# Patient Record
Sex: MALE | Race: WHITE | NOT HISPANIC OR LATINO | Employment: FULL TIME | URBAN - METROPOLITAN AREA
[De-identification: names, ages, dates, MRNs, and addresses within clinical notes are randomized per-mention and may not be internally consistent; named-entity substitution may affect disease eponyms.]

---

## 2021-11-07 ENCOUNTER — HOSPITAL ENCOUNTER (EMERGENCY)
Facility: HOSPITAL | Age: 51
Discharge: HOME | End: 2021-11-07
Attending: EMERGENCY MEDICINE | Admitting: EMERGENCY MEDICINE

## 2021-11-07 VITALS
RESPIRATION RATE: 16 BRPM | DIASTOLIC BLOOD PRESSURE: 99 MMHG | OXYGEN SATURATION: 96 % | HEART RATE: 102 BPM | TEMPERATURE: 99.3 F | SYSTOLIC BLOOD PRESSURE: 133 MMHG | HEIGHT: 74 IN | BODY MASS INDEX: 28.23 KG/M2 | WEIGHT: 220 LBS

## 2021-11-07 DIAGNOSIS — S81.812A LACERATION OF LEFT LOWER EXTREMITY, INITIAL ENCOUNTER: Primary | ICD-10-CM

## 2021-11-07 PROCEDURE — 99282 EMERGENCY DEPT VISIT SF MDM: CPT

## 2021-11-07 RX ORDER — CITALOPRAM 40 MG/1
40 TABLET ORAL DAILY
COMMUNITY

## 2021-11-07 RX ORDER — LIDOCAINE HYDROCHLORIDE 10 MG/ML
10 INJECTION, SOLUTION EPIDURAL; INFILTRATION; INTRACAUDAL; PERINEURAL ONCE
Status: COMPLETED | OUTPATIENT
Start: 2021-11-07 | End: 2021-11-07

## 2021-11-07 RX ORDER — LEVOCETIRIZINE DIHYDROCHLORIDE 5 MG/1
5 TABLET, FILM COATED ORAL EVERY EVENING
COMMUNITY

## 2021-11-07 RX ORDER — OMEPRAZOLE 20 MG/1
20 CAPSULE, DELAYED RELEASE ORAL DAILY
COMMUNITY

## 2021-11-07 RX ORDER — CEPHALEXIN 500 MG/1
500 CAPSULE ORAL 2 TIMES DAILY
Qty: 20 CAPSULE | Refills: 0 | Status: SHIPPED | OUTPATIENT
Start: 2021-11-07 | End: 2021-11-17

## 2021-11-07 RX ADMIN — LIDOCAINE HYDROCHLORIDE 10 ML: 10 INJECTION, SOLUTION EPIDURAL; INFILTRATION; INTRACAUDAL; PERINEURAL at 12:05

## 2021-11-07 NOTE — ED PROVIDER NOTES
Subjective   Pt is a 52 yo male presenting to ED with complaints of left lower leg laceration. Pt reports last night around midnight was jumping over a guardrail after [employer] game and cut his pants. He didn't realize until later that he had a laceration to left lower anterior leg about 6-7 cm long. He denies any pain with walking or known foreign bodies. He reports his Tdap is UTD. He denies any other injuries.       History provided by:  Patient      Review of Systems   Constitutional: Negative for fever.   Respiratory: Negative for cough and shortness of breath.    Cardiovascular: Negative for chest pain.   Gastrointestinal: Negative for abdominal pain, nausea and vomiting.   Musculoskeletal: Negative for arthralgias and back pain.   Skin: Positive for wound (laceration left leg).   Neurological: Negative for weakness and numbness.       History reviewed. No pertinent past medical history.    Allergies   Allergen Reactions   • Penicillins Rash       Past Surgical History:   Procedure Laterality Date   • WISDOM TOOTH EXTRACTION         History reviewed. No pertinent family history.    Social History     Socioeconomic History   • Marital status:    Tobacco Use   • Smoking status: Current Every Day Smoker     Packs/day: 1.00   Substance and Sexual Activity   • Alcohol use: Yes     Comment: 20 beers/wk   • Drug use: Not Currently           Objective   Physical Exam  Vitals and nursing note reviewed.   Constitutional:       General: He is not in acute distress.     Appearance: He is normal weight.   HENT:      Head: Atraumatic.   Eyes:      Extraocular Movements: Extraocular movements intact.      Conjunctiva/sclera: Conjunctivae normal.   Cardiovascular:      Rate and Rhythm: Regular rhythm. Tachycardia present.      Pulses: Normal pulses.      Heart sounds: Normal heart sounds.   Pulmonary:      Effort: Pulmonary effort is normal. No respiratory distress.   Musculoskeletal:         General: Normal range of motion.       Cervical back: Normal range of motion and neck supple.      Left lower leg: Laceration present. No tenderness.        Legs:    Skin:     General: Skin is warm.   Neurological:      General: No focal deficit present.      Mental Status: He is alert and oriented to person, place, and time.   Psychiatric:         Mood and Affect: Mood normal.         Behavior: Behavior normal.         Laceration Repair    Date/Time: 11/7/2021 12:00 PM  Performed by: Lety Ca PA  Authorized by: Veronica Silva MD     Consent:     Consent obtained:  Verbal    Consent given by:  Patient    Risks discussed:  Infection, need for additional repair, nerve damage, pain, poor cosmetic result, poor wound healing, retained foreign body, tendon damage and vascular damage    Alternatives discussed:  No treatment  Anesthesia (see MAR for exact dosages):     Anesthesia method:  Local infiltration    Local anesthetic:  Lidocaine 1% w/o epi  Laceration details:     Location:  Leg    Leg location:  L lower leg    Length (cm):  7  Repair type:     Repair type:  Simple  Pre-procedure details:     Preparation:  Patient was prepped and draped in usual sterile fashion  Exploration:     Contaminated: no    Treatment:     Area cleansed with:  Hibiclens and saline    Amount of cleaning:  Standard    Irrigation solution:  Sterile saline  Skin repair:     Repair method:  Sutures    Suture size:  4-0    Suture material:  Nylon    Suture technique:  Simple interrupted    Number of sutures:  16  Approximation:     Approximation:  Close  Post-procedure details:     Dressing:  Non-adherent dressing    Patient tolerance of procedure:  Tolerated well, no immediate complications               ED Course      No results found for this or any previous visit (from the past 24 hour(s)).  Note: In addition to lab results from this visit, the labs listed above may include labs taken at another facility or during a different encounter within the last 24 hours.  "Please correlate lab times with ED admission and discharge times for further clarification of the services performed during this visit.    No orders to display     Vitals:    11/07/21 0937 11/07/21 1000   BP: 142/94 133/99   BP Location: Left arm    Patient Position: Sitting    Pulse: 102    Resp: 16    Temp: 99.3 °F (37.4 °C)    TempSrc: Tympanic    SpO2: 97% 96%   Weight: 99.8 kg (220 lb)    Height: 188 cm (74\")      Medications   lidocaine PF 1% (XYLOCAINE) injection 10 mL (10 mL Injection Given by Other 11/7/21 1205)     ECG/EMG Results (last 24 hours)     ** No results found for the last 24 hours. **        No orders to display     DISCHARGE    Patient discharged in stable condition.    Reviewed implications of results, diagnosis, meds, responsibility to follow up, warning signs and symptoms of possible worsening, potential complications and reasons to return to ER.    Patient/Family voiced understanding of above instructions.    Discussed plan for discharge, as there is no emergent indication for admission.  Pt/family is agreeable and understands need for follow up and possible repeat testing.  Pt/family is aware that discharge does not mean that nothing is wrong but that it indicates no emergency is currently present that requires admission and they must continue care with follow-up as given below or with a physician of their choice.     FOLLOW-UP  PATIENT CONNECTION - [facility] [address]  [phone]  In 10 days  For suture removal, For wound re-check    [facility] Emergency Department  [address] [address] [phone]    If symptoms worsen         Medication List      New Prescriptions    cephalexin 500 MG capsule  Commonly known as: KEFLEX  Take 1 capsule by mouth 2 (Two) Times a Day for 10 days.           Where to Get Your Medications      These medications were sent to [facility]/pharmacy #[id number] - [place], [place] - [address] - [phone] "  - [phone] FX  [address], [facility] [address]    Hours: 24-hours Phone: [phone]   · cephalexin 500 MG capsule                                              MDM    Final diagnoses:   Laceration of left lower extremity, initial encounter       ED Disposition  ED Disposition     ED Disposition Condition Comment    Discharge Stable           PATIENT CONNECTION - [facility] [address]  [phone]  In 10 days  For suture removal, For wound re-check    [facility] Emergency Department  [address] [facility] [address] [phone]    If symptoms worsen         Medication List      New Prescriptions    cephalexin 500 MG capsule  Commonly known as: KEFLEX  Take 1 capsule by mouth 2 (Two) Times a Day for 10 days.           Where to Get Your Medications      These medications were sent to [facility]/pharmacy #[id number] - [place], [place] - [address] - [phone]  - [phone] FX  [address], [facility] [address]    Hours: 24-hours Phone: [phone]   · cephalexin 500 MG capsule          Lety Ca PA  11/07/21 1554

## 2024-05-18 ENCOUNTER — APPOINTMENT (OUTPATIENT)
Dept: GENERAL RADIOLOGY | Facility: HOSPITAL | Age: 54
DRG: 310 | End: 2024-05-18
Payer: MEDICAID

## 2024-05-18 ENCOUNTER — HOSPITAL ENCOUNTER (INPATIENT)
Facility: HOSPITAL | Age: 54
LOS: 1 days | Discharge: HOME | DRG: 310 | End: 2024-05-19
Attending: EMERGENCY MEDICINE | Admitting: INTERNAL MEDICINE
Payer: MEDICAID

## 2024-05-18 DIAGNOSIS — R06.02 SHORTNESS OF BREATH: ICD-10-CM

## 2024-05-18 DIAGNOSIS — R79.89 ELEVATED TROPONIN: ICD-10-CM

## 2024-05-18 DIAGNOSIS — R07.9 CHEST PAIN, UNSPECIFIED TYPE: ICD-10-CM

## 2024-05-18 DIAGNOSIS — R00.2 RAPID PALPITATIONS: Primary | ICD-10-CM

## 2024-05-18 DIAGNOSIS — I47.10 SVT (SUPRAVENTRICULAR TACHYCARDIA): ICD-10-CM

## 2024-05-18 LAB
ALBUMIN SERPL-MCNC: 4.1 G/DL (ref 3.5–5.2)
ALBUMIN/GLOB SERPL: 1.5 G/DL
ALP SERPL-CCNC: 109 U/L (ref 39–117)
ALT SERPL W P-5'-P-CCNC: 15 U/L (ref 1–41)
ANION GAP SERPL CALCULATED.3IONS-SCNC: 15 MMOL/L (ref 5–15)
AST SERPL-CCNC: 20 U/L (ref 1–40)
BASOPHILS # BLD AUTO: 0.05 10*3/MM3 (ref 0–0.2)
BASOPHILS NFR BLD AUTO: 0.7 % (ref 0–1.5)
BILIRUB SERPL-MCNC: 0.3 MG/DL (ref 0–1.2)
BUN SERPL-MCNC: 13 MG/DL (ref 6–20)
BUN/CREAT SERPL: 10.9 (ref 7–25)
CALCIUM SPEC-SCNC: 8.9 MG/DL (ref 8.6–10.5)
CHLORIDE SERPL-SCNC: 103 MMOL/L (ref 98–107)
CO2 SERPL-SCNC: 20 MMOL/L (ref 22–29)
CREAT SERPL-MCNC: 1.19 MG/DL (ref 0.76–1.27)
D DIMER PPP FEU-MCNC: <0.27 MCGFEU/ML (ref 0–0.52)
DEPRECATED RDW RBC AUTO: 44.5 FL (ref 37–54)
EGFRCR SERPLBLD CKD-EPI 2021: 73.5 ML/MIN/1.73
EOSINOPHIL # BLD AUTO: 0.08 10*3/MM3 (ref 0–0.4)
EOSINOPHIL NFR BLD AUTO: 1.1 % (ref 0.3–6.2)
ERYTHROCYTE [DISTWIDTH] IN BLOOD BY AUTOMATED COUNT: 13.1 % (ref 12.3–15.4)
GEN 5 2HR TROPONIN T REFLEX: 36 NG/L
GLOBULIN UR ELPH-MCNC: 2.8 GM/DL
GLUCOSE SERPL-MCNC: 179 MG/DL (ref 65–99)
HCT VFR BLD AUTO: 45.2 % (ref 37.5–51)
HGB BLD-MCNC: 15.1 G/DL (ref 13–17.7)
HOLD SPECIMEN: NORMAL
IMM GRANULOCYTES # BLD AUTO: 0.04 10*3/MM3 (ref 0–0.05)
IMM GRANULOCYTES NFR BLD AUTO: 0.6 % (ref 0–0.5)
LYMPHOCYTES # BLD AUTO: 1.41 10*3/MM3 (ref 0.7–3.1)
LYMPHOCYTES NFR BLD AUTO: 19.6 % (ref 19.6–45.3)
MAGNESIUM SERPL-MCNC: 1.9 MG/DL (ref 1.6–2.6)
MCH RBC QN AUTO: 30.8 PG (ref 26.6–33)
MCHC RBC AUTO-ENTMCNC: 33.4 G/DL (ref 31.5–35.7)
MCV RBC AUTO: 92.1 FL (ref 79–97)
MONOCYTES # BLD AUTO: 0.44 10*3/MM3 (ref 0.1–0.9)
MONOCYTES NFR BLD AUTO: 6.1 % (ref 5–12)
NEUTROPHILS NFR BLD AUTO: 5.18 10*3/MM3 (ref 1.7–7)
NEUTROPHILS NFR BLD AUTO: 71.9 % (ref 42.7–76)
NRBC BLD AUTO-RTO: 0 /100 WBC (ref 0–0.2)
NT-PROBNP SERPL-MCNC: 68.2 PG/ML (ref 0–900)
PLATELET # BLD AUTO: 202 10*3/MM3 (ref 140–450)
PMV BLD AUTO: 10.6 FL (ref 6–12)
POTASSIUM SERPL-SCNC: 3.8 MMOL/L (ref 3.5–5.2)
PROT SERPL-MCNC: 6.9 G/DL (ref 6–8.5)
RBC # BLD AUTO: 4.91 10*6/MM3 (ref 4.14–5.8)
SODIUM SERPL-SCNC: 138 MMOL/L (ref 136–145)
TROPONIN T DELTA: 20 NG/L
TROPONIN T SERPL HS-MCNC: 16 NG/L
TSH SERPL DL<=0.05 MIU/L-ACNC: 1.7 UIU/ML (ref 0.27–4.2)
WBC NRBC COR # BLD AUTO: 7.2 10*3/MM3 (ref 3.4–10.8)
WHOLE BLOOD HOLD COAG: NORMAL
WHOLE BLOOD HOLD SPECIMEN: NORMAL

## 2024-05-18 PROCEDURE — 84484 ASSAY OF TROPONIN QUANT: CPT | Performed by: EMERGENCY MEDICINE

## 2024-05-18 PROCEDURE — 85025 COMPLETE CBC W/AUTO DIFF WBC: CPT | Performed by: EMERGENCY MEDICINE

## 2024-05-18 PROCEDURE — 36415 COLL VENOUS BLD VENIPUNCTURE: CPT

## 2024-05-18 PROCEDURE — 83880 ASSAY OF NATRIURETIC PEPTIDE: CPT | Performed by: EMERGENCY MEDICINE

## 2024-05-18 PROCEDURE — 85379 FIBRIN DEGRADATION QUANT: CPT | Performed by: EMERGENCY MEDICINE

## 2024-05-18 PROCEDURE — 84443 ASSAY THYROID STIM HORMONE: CPT | Performed by: EMERGENCY MEDICINE

## 2024-05-18 PROCEDURE — 80053 COMPREHEN METABOLIC PANEL: CPT | Performed by: EMERGENCY MEDICINE

## 2024-05-18 PROCEDURE — 93005 ELECTROCARDIOGRAM TRACING: CPT | Performed by: EMERGENCY MEDICINE

## 2024-05-18 PROCEDURE — 71045 X-RAY EXAM CHEST 1 VIEW: CPT

## 2024-05-18 PROCEDURE — 83735 ASSAY OF MAGNESIUM: CPT | Performed by: EMERGENCY MEDICINE

## 2024-05-18 PROCEDURE — 99285 EMERGENCY DEPT VISIT HI MDM: CPT

## 2024-05-18 RX ORDER — SODIUM CHLORIDE 9 MG/ML
40 INJECTION, SOLUTION INTRAVENOUS AS NEEDED
Status: DISCONTINUED | OUTPATIENT
Start: 2024-05-18 | End: 2024-05-19 | Stop reason: HOSPADM

## 2024-05-18 RX ORDER — ROSUVASTATIN CALCIUM 20 MG/1
20 TABLET, COATED ORAL NIGHTLY
Status: DISCONTINUED | OUTPATIENT
Start: 2024-05-18 | End: 2024-05-19 | Stop reason: HOSPADM

## 2024-05-18 RX ORDER — BISACODYL 5 MG/1
5 TABLET, DELAYED RELEASE ORAL DAILY PRN
Status: DISCONTINUED | OUTPATIENT
Start: 2024-05-18 | End: 2024-05-19 | Stop reason: HOSPADM

## 2024-05-18 RX ORDER — NITROGLYCERIN 0.4 MG/1
0.4 TABLET SUBLINGUAL
Status: DISCONTINUED | OUTPATIENT
Start: 2024-05-18 | End: 2024-05-19 | Stop reason: HOSPADM

## 2024-05-18 RX ORDER — SODIUM CHLORIDE 0.9 % (FLUSH) 0.9 %
10 SYRINGE (ML) INJECTION AS NEEDED
Status: DISCONTINUED | OUTPATIENT
Start: 2024-05-18 | End: 2024-05-19 | Stop reason: HOSPADM

## 2024-05-18 RX ORDER — BISACODYL 10 MG
10 SUPPOSITORY, RECTAL RECTAL DAILY PRN
Status: DISCONTINUED | OUTPATIENT
Start: 2024-05-18 | End: 2024-05-19 | Stop reason: HOSPADM

## 2024-05-18 RX ORDER — SODIUM CHLORIDE 0.9 % (FLUSH) 0.9 %
10 SYRINGE (ML) INJECTION EVERY 12 HOURS SCHEDULED
Status: DISCONTINUED | OUTPATIENT
Start: 2024-05-18 | End: 2024-05-19 | Stop reason: HOSPADM

## 2024-05-18 RX ORDER — AMOXICILLIN 250 MG
2 CAPSULE ORAL 2 TIMES DAILY
Status: DISCONTINUED | OUTPATIENT
Start: 2024-05-18 | End: 2024-05-19 | Stop reason: HOSPADM

## 2024-05-18 RX ORDER — ASPIRIN 81 MG/1
81 TABLET ORAL DAILY
Status: DISCONTINUED | OUTPATIENT
Start: 2024-05-18 | End: 2024-05-19 | Stop reason: HOSPADM

## 2024-05-18 RX ORDER — POLYETHYLENE GLYCOL 3350 17 G/17G
17 POWDER, FOR SOLUTION ORAL DAILY PRN
Status: DISCONTINUED | OUTPATIENT
Start: 2024-05-18 | End: 2024-05-19 | Stop reason: HOSPADM

## 2024-05-18 RX ADMIN — METOPROLOL TARTRATE 25 MG: 25 TABLET, FILM COATED ORAL at 16:53

## 2024-05-18 RX ADMIN — Medication 10 ML: at 20:12

## 2024-05-18 RX ADMIN — ROSUVASTATIN CALCIUM 20 MG: 20 TABLET, COATED ORAL at 20:11

## 2024-05-18 RX ADMIN — ASPIRIN 81 MG: 81 TABLET, COATED ORAL at 16:54

## 2024-05-18 NOTE — ED PROVIDER NOTES
Subjective   History of Present Illness  Mr. Rodrigues presents by ambulance with chest pain and shortness of breath and tachypalpitations.  He reports that he felt a skipped beat and then had to catch his breath.  Shortly after that his heart began beating very quickly.  He developed shortness of breath.  He developed chest pain which she describes as a sensation of not being able to get a deep enough breath.  He tells me this has happened multiple times over a 10 or 15-year period.  He tells me he still feels dizzy although the feeling of rapid heart rate and shortness of breath are gone.  He denies any family history of heart trouble.  He tells me he takes a pill for high cholesterol and diabetes as well as Zoloft.  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      Review of Systems    History reviewed. No pertinent past medical history.    Allergies   Allergen Reactions    Penicillins Rash       Past Surgical History:   Procedure Laterality Date    WISDOM TOOTH EXTRACTION         History reviewed. No pertinent family history.    Social History     Socioeconomic History    Marital status:    Tobacco Use    Smoking status: Every Day     Current packs/day: 1.00     Types: Cigarettes   Vaping Use    Vaping status: Never Used   Substance and Sexual Activity    Alcohol use: Yes     Comment: 20 beers/wk    Drug use: Not Currently           Objective   Physical Exam  Vitals and nursing note reviewed.   Constitutional:       General: He is not in acute distress.     Appearance: Normal appearance.   HENT:      Head: Normocephalic and atraumatic.      Nose: Nose normal. No congestion or rhinorrhea.   Eyes:      General: No scleral icterus.     Conjunctiva/sclera: Conjunctivae normal.   Neck:      Comments: No JVD   Cardiovascular:      Rate and Rhythm: Regular rhythm. Tachycardia present.      Heart sounds: No murmur heard.     No friction rub.   Pulmonary:      Breath sounds: Normal breath sounds. No wheezing or rales.       Comments: He is tachypneic and taking deep breaths  Abdominal:      General: Bowel sounds are normal.      Palpations: Abdomen is soft.      Tenderness: There is no abdominal tenderness. There is no guarding or rebound.   Musculoskeletal:         General: No tenderness.      Cervical back: Normal range of motion and neck supple.      Right lower leg: No edema.      Left lower leg: No edema.   Skin:     General: Skin is warm and dry.      Coloration: Skin is not pale.      Findings: No erythema.   Neurological:      General: No focal deficit present.      Mental Status: He is alert and oriented to person, place, and time.      Motor: No weakness.      Coordination: Coordination normal.   Psychiatric:         Mood and Affect: Mood normal.         Behavior: Behavior normal.         Thought Content: Thought content normal.         Procedures           ED Course  ED Course as of 05/19/24 0655   Sat May 18, 2024   1204 Reviewed electronic medical record.  Reviewed the rhythm strip EMS brought.  This shows only sinus tachycardia. [DT]   1334 He remains in normal sinus rhythm without complaint.  I spoke with him and his friend about follow-up in the [facility] [place] heart and valve clinic for further evaluation.  Follow-up with his PCP.  Will discharge as long as second troponin is reassuring [DT]   1523 I discussed the case with Dr. Dukes regarding the change in trop. I messaged with the hospitalist.  I talked with the patient and advised him of the findings and the plan for admission.  The patient was initially hesitant but after further discussion is agreeable. [RS]      ED Course User Index  [DT] Efraín Dukes MD  [RS] Aleksander Cummings MD                                             Medical Decision Making  Please see course notes.  I ordered and interpreted multiple labs including troponins several hours apart.  Observed him on a monitor for several hours.  Had multiple reevaluations.  Consulted cardiology who admitted  him to the hospital    Problems Addressed:  Chest pain, unspecified type: complicated acute illness or injury  Elevated troponin: complicated acute illness or injury that poses a threat to life or bodily functions  Rapid palpitations: complicated acute illness or injury  Shortness of breath: complicated acute illness or injury    Amount and/or Complexity of Data Reviewed  Labs: ordered. Decision-making details documented in ED Course.  Radiology: ordered. Decision-making details documented in ED Course.  ECG/medicine tests: ordered. Decision-making details documented in ED Course.    Risk  Prescription drug management.  Decision regarding hospitalization.        Final diagnoses:   Rapid palpitations   Chest pain, unspecified type   Shortness of breath   Elevated troponin       ED Disposition  ED Disposition       ED Disposition   Decision to Admit    Condition   --    Comment   Level of Care: Telemetry [5]   Diagnosis: SVT (supraventricular tachycardia) [202906]   Certification: I Certify That Inpatient Hospital Services Are Medically Necessary For Greater Than 2 Midnights                 No follow-up provider specified.       Medication List      No changes were made to your prescriptions during this visit.            Efraín Dukes MD  05/19/24 6199

## 2024-05-18 NOTE — H&P
[facility] History & Physical    Referring Provider: No ref. provider found    Patient Care Team:  Provider, No Known as PCP - General    Chief complaint   palpitations    Subjective .     History of present illness: 53-year-old man with a history of tobacco use, type 2 diabetes mellitus and hyperlipidemia seen in the emergency room this afternoon.  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  His symptoms lasted for approximately 20 minutes and resolved spontaneously.  He was then brought to the emergency room for further evaluation.  He denies any anginal type complaints.  He did not have miles syncope.  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  He also plays golf.  When he does not have tachypalpitations he has no history of angina or any prior history of cardiac disease.  He states that he has had these kinds of episodes of tachypalpitations over the past 10 to 15 years.  Sometimes he will have 2-3 episodes a month and then he will not have any for couple of years.    Review of System  A 14 point review of systems was negative except as was stated in the HPI    History  History reviewed. No pertinent past medical history.  Past Surgical History:   Procedure Laterality Date    WISDOM TOOTH EXTRACTION       History reviewed. No pertinent family history.  Social History     Tobacco Use    Smoking status: Every Day     Current packs/day: 1.00     Types: Cigarettes   Substance Use Topics    Alcohol use: Yes     Comment: 20 beers/wk    Drug use: Not Currently     (Not in a hospital admission)    Scheduled Meds:     Continuous Infusions:     PRN Meds:    sodium chloride  Allergies:  Penicillins    Objective     Vital Sign Min/Max for last 24 hours  Temp  Min: 98 °F (36.7 °C)  Max: 98 °F (36.7 °C)   BP  Min: 126/97  Max: 158/98   Pulse  Min: 86  Max: 112   Resp  Min: 20   "Max: 20   SpO2  Min: 94 %  Max: 98 %   No data recorded   Weight  Min: 99.8 kg (220 lb)  Max: 99.8 kg (220 lb)     Flowsheet Rows      Flowsheet Row First Filed Value   Admission Height 188 cm (74\") Documented at 05/18/2024 1158   Admission Weight 99.8 kg (220 lb) Documented at 05/18/2024 1158               Physical Exam:  General Appearance: Alert, appears stated age and cooperative  Lungs: Clear to ascultation  Heart:: RRR  No Murmurs, Rubs or Gallops  Abdomen: Soft and nontender with adequate bowel sounds. No organomegaly  Extremities: No cyanosis, clubbing or edema  Pulses: Pulses palpable and equal bilaterally  Skin: Warm and dry with no rash  Psych: Normal    Results Review:   I reviewed the patient's new clinical results.  Results from last 7 days   Lab Units 05/18/24  1210   WBC 10*3/mm3 7.20   HEMOGLOBIN g/dL 15.1   HEMATOCRIT % 45.2   PLATELETS 10*3/mm3 202     Results from last 7 days   Lab Units 05/18/24  1210   SODIUM mmol/L 138   POTASSIUM mmol/L 3.8   CHLORIDE mmol/L 103   CO2 mmol/L 20.0*   BUN mg/dL 13   CREATININE mg/dL 1.19   GLUCOSE mg/dL 179*   CALCIUM mg/dL 8.9     Lab Results   Lab Value Date/Time    TROPONINT 36 (H) 05/18/2024 1404    TROPONINT 16 05/18/2024 1210                   EKG and chest x-ray both normal      * No active hospital problems. *      Impression      Tachypalpitations/probable SVT  Tobacco use  Hypertension  Type 2 diabetes mellitus  Hyperlipidemia  Borderline HST elevation probably demand        Plan     Admit to telemetry for observation  Begin beta-blocker  Check echocardiography  Monitor telemetry for recurrent arrhythmia  Would plan outpatient stress testing as screening  for ischemic heart disease      I discussed the patient's findings and my recommendations with patient    Karl Davidosn MD   05/18/24  16:10 EDT            "

## 2024-05-18 NOTE — DISCHARGE INSTRUCTIONS
Return if you have further episodes or any other concerns.  A nurse from the [facility] heart and valve clinic will call you on Monday and arrange follow-up.  I would also like you to follow-up with your primary care provider.  Call them.   [Takes medication as prescribed] : takes [None] : Patient does not have any barriers to medication adherence

## 2024-05-18 NOTE — ED NOTES
Sean Rodrigues    Nursing Report ED to Floor:  Mental status: A&Ox4  Ambulatory status: self  Oxygen Therapy:  RA  Cardiac Rhythm: NSR/sinus tach  Admitted from: ED  Safety Concerns:  falls  Social Issues: none  ED Room #:  10    ED Nurse Phone Extension - [phone] or may call [phone].      HPI:   Chief Complaint   Patient presents with    Palpitations       Past Medical History:  History reviewed. No pertinent past medical history.     Past Surgical History:  Past Surgical History:   Procedure Laterality Date    WISDOM TOOTH EXTRACTION          Admitting Doctor:   No admitting provider for patient encounter.    Consulting Provider(s):  Consults       Date and Time Order Name Status Description    5/18/2024  4:25 PM Inpatient Hospitalist Consult               Admitting Diagnosis:   The primary encounter diagnosis was Rapid palpitations. Diagnoses of Chest pain, unspecified type, Shortness of breath, and Elevated troponin were also pertinent to this visit.    Most Recent Vitals:   Vitals:    05/18/24 1400 05/18/24 1430 05/18/24 1500 05/18/24 1600   BP: 144/90 138/93 141/97 158/98   BP Location:       Patient Position:       Pulse: 86 88 87 93   Resp:       Temp:       TempSrc:       SpO2: 97% 96% 94% 97%   Weight:       Height:           Active LDAs/IV Access:   Lines, Drains & Airways       Active LDAs       Name Placement date Placement time Site Days    Peripheral IV Anterior;Distal;Right;Upper Arm --  --  Arm  --                    Labs (abnormal labs have a star):   Labs Reviewed   COMPREHENSIVE METABOLIC PANEL - Abnormal; Notable for the following components:       Result Value    Glucose 179 (*)     CO2 20.0 (*)     All other components within normal limits    Narrative:     GFR Normal >60  Chronic Kidney Disease <60  Kidney Failure <15     CBC WITH AUTO DIFFERENTIAL - Abnormal; Notable for the following components:    Immature Grans % 0.6 (*)     All other components within normal limits   HIGH SENSITIVITIY  TROPONIN T 2HR - Abnormal; Notable for the following components:    HS Troponin T 36 (*)     Troponin T Delta 20 (*)     All other components within normal limits    Narrative:     High Sensitive Troponin T Reference Range:  <14.0 ng/L- Negative Female for AMI  <22.0 ng/L- Negative Male for AMI  >=14 - Abnormal Female indicating possible myocardial injury.  >=22 - Abnormal Male indicating possible myocardial injury.   Clinicians would have to utilize clinical acumen, EKG, Troponin, and serial changes to determine if it is an Acute Myocardial Infarction or myocardial injury due to an underlying chronic condition.        MAGNESIUM - Normal   TSH - Normal   BNP (IN-HOUSE) - Normal    Narrative:     This assay is used as an aid in the diagnosis of individuals suspected of having heart failure. It can be used as an aid in the diagnosis of acute decompensated heart failure (ADHF) in patients presenting with signs and symptoms of ADHF to the emergency department (ED). In addition, NT-proBNP of <300 pg/mL indicates ADHF is not likely.    Age Range Result Interpretation  NT-proBNP Concentration (pg/mL:      <50             Positive            >450                   Gray                 300-450                    Negative             <300    50-75           Positive            >900                  Gray                300-900                  Negative            <300      >75             Positive            >1800                  Gray                300-1800                  Negative            <300   TROPONIN - Normal    Narrative:     High Sensitive Troponin T Reference Range:  <14.0 ng/L- Negative Female for AMI  <22.0 ng/L- Negative Male for AMI  >=14 - Abnormal Female indicating possible myocardial injury.  >=22 - Abnormal Male indicating possible myocardial injury.   Clinicians would have to utilize clinical acumen, EKG, Troponin, and serial changes to determine if it is an Acute Myocardial Infarction or myocardial  "injury due to an underlying chronic condition.        D-DIMER, QUANTITATIVE - Normal    Narrative:     According to the assay 's published package insert, a normal (<0.50 MCGFEU/mL) D-dimer result in conjunction with a non-high clinical probability assessment, excludes deep vein thrombosis (DVT) and pulmonary embolism (PE) with high sensitivity.    D-dimer values increase with age and this can make VTE exclusion of an older population difficult. To address this, the [employer], based on best available evidence and recent guidelines, recommends that clinicians use age-adjusted D-dimer thresholds in patients greater than 50 years of age with: a) a low probability of PE who do not meet all Pulmonary Embolism Rule Out Criteria, or b) in those with intermediate probability of PE.   The formula for an age-adjusted D-dimer cut-off is \"age/100\".  For example, a 60 year old patient would have an age-adjusted cut-off of 0.60 MCGFEU/mL and an 80 year old 0.80 MCGFEU/mL.   RAINBOW DRAW    Narrative:     The following orders were created for panel order [place] Draw.  Procedure                               Abnormality         Status                     ---------                               -----------         ------                     Green Top (Gel)[[id number]]                                  Final result               Lavender Top[[id number]]                                     Final result               Gold Top - SST[[id number]]                                   Final result               Kan Top[[id number]]                                         Final result               Light Blue Top[[id number]]                                   Final result                 Please view results for these tests on the individual orders.   CBC AND DIFFERENTIAL    Narrative:     The following orders were created for panel order CBC & Differential.  Procedure                               Abnormality         " Status                     ---------                               -----------         ------                     CBC Auto Differential[[id number]]        Abnormal            Final result                 Please view results for these tests on the individual orders.   GREEN TOP   LAVENDER TOP   GOLD TOP - SST   GRAY TOP   LIGHT BLUE TOP       Meds Given in ED:   Medications   sodium chloride 0.9 % flush 10 mL (has no administration in time range)   nitroglycerin (NITROSTAT) SL tablet 0.4 mg (has no administration in time range)   sodium chloride 0.9 % flush 10 mL (has no administration in time range)   sodium chloride 0.9 % flush 10 mL (has no administration in time range)   sodium chloride 0.9 % infusion 40 mL (has no administration in time range)   sennosides-docusate (PERICOLACE) 8.6-50 MG per tablet 2 tablet (has no administration in time range)     And   polyethylene glycol (MIRALAX) packet 17 g (has no administration in time range)     And   bisacodyl (DULCOLAX) EC tablet 5 mg (has no administration in time range)     And   bisacodyl (DULCOLAX) suppository 10 mg (has no administration in time range)   aspirin EC tablet 81 mg (has no administration in time range)   rosuvastatin (CRESTOR) tablet 20 mg (has no administration in time range)   metoprolol tartrate (LOPRESSOR) tablet 25 mg (has no administration in time range)           Last NIH score:                                                          Dysphagia screening results:  Patient Factors Component (Dysphagia:Stroke or Rule-out)  Best Eye Response: 4-->(E4) spontaneous (05/18/24 1324)  Best Motor Response: 6-->(M6) obeys commands (05/18/24 1324)  Best Verbal Response: 5-->(V5) oriented (05/18/24 1324)  Umberto Coma Scale Score: 15 (05/18/24 1324)     Umberto Coma Scale:  No data recorded     CIWA:        Restraint Type:            Isolation Status:  No active isolations

## 2024-05-19 ENCOUNTER — APPOINTMENT (OUTPATIENT)
Dept: CARDIOLOGY | Facility: HOSPITAL | Age: 54
DRG: 310 | End: 2024-05-19
Payer: MEDICAID

## 2024-05-19 VITALS
WEIGHT: 204 LBS | RESPIRATION RATE: 16 BRPM | TEMPERATURE: 97.7 F | HEIGHT: 74 IN | BODY MASS INDEX: 26.18 KG/M2 | SYSTOLIC BLOOD PRESSURE: 119 MMHG | OXYGEN SATURATION: 95 % | DIASTOLIC BLOOD PRESSURE: 67 MMHG | HEART RATE: 86 BPM

## 2024-05-19 LAB
ASCENDING AORTA: 3.2 CM
BH CV ECHO MEAS - AO MAX PG: 7.7 MMHG
BH CV ECHO MEAS - AO MEAN PG: 4 MMHG
BH CV ECHO MEAS - AO ROOT DIAM: 3.2 CM
BH CV ECHO MEAS - AO V2 MAX: 139 CM/SEC
BH CV ECHO MEAS - AO V2 VTI: 39 CM
BH CV ECHO MEAS - AVA(I,D): 2.08 CM2
BH CV ECHO MEAS - EDV(CUBED): 59.3 ML
BH CV ECHO MEAS - EDV(MOD-SP2): 66.1 ML
BH CV ECHO MEAS - EDV(MOD-SP4): 86.8 ML
BH CV ECHO MEAS - EF(MOD-BP): 69.2 %
BH CV ECHO MEAS - EF(MOD-SP2): 63.4 %
BH CV ECHO MEAS - EF(MOD-SP4): 74.5 %
BH CV ECHO MEAS - ESV(CUBED): 15.6 ML
BH CV ECHO MEAS - ESV(MOD-SP2): 24.2 ML
BH CV ECHO MEAS - ESV(MOD-SP4): 22.1 ML
BH CV ECHO MEAS - FS: 35.9 %
BH CV ECHO MEAS - IVS/LVPW: 1 CM
BH CV ECHO MEAS - IVSD: 1 CM
BH CV ECHO MEAS - LA DIMENSION: 3.4 CM
BH CV ECHO MEAS - LAT PEAK E' VEL: 12.5 CM/SEC
BH CV ECHO MEAS - LV MASS(C)D: 122.1 GRAMS
BH CV ECHO MEAS - LV MAX PG: 4.1 MMHG
BH CV ECHO MEAS - LV MEAN PG: 2 MMHG
BH CV ECHO MEAS - LV V1 MAX: 101 CM/SEC
BH CV ECHO MEAS - LV V1 VTI: 25.8 CM
BH CV ECHO MEAS - LVIDD: 3.9 CM
BH CV ECHO MEAS - LVIDS: 2.5 CM
BH CV ECHO MEAS - LVOT AREA: 3.1 CM2
BH CV ECHO MEAS - LVOT DIAM: 2 CM
BH CV ECHO MEAS - LVPWD: 1 CM
BH CV ECHO MEAS - MED PEAK E' VEL: 9.4 CM/SEC
BH CV ECHO MEAS - MV A MAX VEL: 72.8 CM/SEC
BH CV ECHO MEAS - MV DEC SLOPE: 351 CM/SEC2
BH CV ECHO MEAS - MV DEC TIME: 0.24 SEC
BH CV ECHO MEAS - MV E MAX VEL: 85.4 CM/SEC
BH CV ECHO MEAS - MV E/A: 1.17
BH CV ECHO MEAS - PA ACC TIME: 0.14 SEC
BH CV ECHO MEAS - PA V2 MAX: 99.7 CM/SEC
BH CV ECHO MEAS - SV(LVOT): 81.1 ML
BH CV ECHO MEAS - SV(MOD-SP2): 41.9 ML
BH CV ECHO MEAS - SV(MOD-SP4): 64.7 ML
BH CV ECHO MEAS - TAPSE (>1.6): 2.6 CM
BH CV ECHO MEASUREMENTS AVERAGE E/E' RATIO: 7.8
BH CV VAS BP RIGHT ARM: NORMAL MMHG
BH CV XLRA - RV BASE: 3.3 CM
BH CV XLRA - RV LENGTH: 5.5 CM
BH CV XLRA - RV MID: 3.1 CM
BH CV XLRA - TDI S': 12 CM/SEC
IVRT: 123 MS
LEFT ATRIUM VOLUME INDEX: 16.4 ML/M2
QT INTERVAL: 338 MS
QTC INTERVAL: 463 MS

## 2024-05-19 PROCEDURE — 93306 TTE W/DOPPLER COMPLETE: CPT

## 2024-05-19 RX ORDER — METOPROLOL SUCCINATE 50 MG/1
50 TABLET, EXTENDED RELEASE ORAL DAILY
Qty: 30 TABLET | Refills: 11 | Status: SHIPPED | OUTPATIENT
Start: 2024-05-19

## 2024-05-19 RX ORDER — ASPIRIN 81 MG/1
81 TABLET ORAL DAILY
Qty: 90 TABLET | Refills: 3 | Status: SHIPPED | OUTPATIENT
Start: 2024-05-19

## 2024-05-19 RX ORDER — ROSUVASTATIN CALCIUM 20 MG/1
20 TABLET, COATED ORAL NIGHTLY
Qty: 90 TABLET | Refills: 3 | Status: SHIPPED | OUTPATIENT
Start: 2024-05-19 | End: 2024-05-23

## 2024-05-19 RX ADMIN — ASPIRIN 81 MG: 81 TABLET, COATED ORAL at 09:58

## 2024-05-19 RX ADMIN — METOPROLOL TARTRATE 25 MG: 25 TABLET, FILM COATED ORAL at 09:58

## 2024-05-19 NOTE — DISCHARGE SUMMARY
Sean Rodrigues  1970      Discharge summary    Admit date: 5/18/2024  DC date: 5/19/2024    Admit history: 53-year-old -American male presents to [facility] ED with tachypalpitations lightheadedness, probable SVT.  Admitted for observation.    Hospital course: Mr. Rodrigues is a 53-year-old -American male admitted overnight for probable SVT.  He also has PMHx HTN, DM2, HLP, ongoing tobacco abuse.  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  He reports that he has had these episodes over the last 10 to 15 years sometimes 2-3 episodes a month.  He was started on a beta-blocker here at the hospital and underwent echocardiogram.  Echocardiogram was reviewed at bedside showing normal LV function without valvular or wall motion abnormalities.    Is felt the patient could be discharged home with close monitoring.  We will have him follow-up with Dr. Davidson in 4 weeks with EKG and nuclear MPS on same day as appointment.  We are also placing referral to EP, Dr. Yannick Barboza, for evaluation for possible ablation.  Patient will be educated on smoking cessation education prior to discharge.  We recommend the following meds at time of discharge: Toprol-XL 50 mg p.o. daily, ASA 81 mg daily, Crestor 20 mg nightly, omeprazole 20 mg daily, Celexa 40 mg daily, Xyzal 5 mg nightly, metformin as directed.    This is a summary on Mr. Sean Rodrigues.  Please see chart for further details.    Ruthy Aldana PA-C  Electronically signed by ADRYAN Houston, 05/19/24, 8:49 AM EDT.

## 2024-05-20 NOTE — PAYOR COMM NOTE
"Trevin Jackson (53 y.o. Male)     [id number]       Yaquelin Anne RN  Utilization Review  [phone]  [phone]        Date of Birth   1970    Social Security Number       Address   [address] [facility] [address]    Home Phone       MRN   [id number]       Yazidi   Uatsdin    Marital Status                               Admission Date   5/18/24    Admission Type   Emergency    Admitting Provider   Karl Davidson MD    Attending Provider       Department, Room/Bed   [facility] 6A, N615/1       Discharge Date   5/19/2024    Discharge Disposition   Home or Self Care    Discharge Destination                                 Attending Provider: (none)   Allergies: Penicillins    Isolation: None   Infection: None   Code Status: Prior    Ht: 188 cm (74\")   Wt: 92.5 kg (204 lb)    Admission Cmt: None   Principal Problem: SVT (supraventricular tachycardia) [I47.10]                   Active Insurance as of 5/18/2024       Primary Coverage       Payor Plan Insurance Group Employer/Plan Group    [place] [id number]       Payor Plan Address Payor Plan Phone Number Payor Plan Fax Number Effective Dates    [employer] [address] [address] [phone]  1/1/2021 - None Entered    [facility] [id number]         Subscriber Name Subscriber Birth Date Member ID       TREVIN JACKSON 1970 [id number]                     Emergency Contacts        (Rel.) Home Phone Work Phone Mobile Phone    JEFFERSON JACKSON (Spouse) [phone] -- --                 History & Physical        Karl Davidson MD at 05/18/24 1610          [facility] History & Physical    Referring Provider: No ref. provider found    Patient Care Team:  Provider, No Known as PCP - General    Chief complaint   palpitations    Subjective.     History of present illness: 53-year-old man with a history of tobacco use, type 2 diabetes " "mellitus and hyperlipidemia seen in the emergency room this afternoon.  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  His symptoms lasted for approximately 20 minutes and resolved spontaneously.  He was then brought to the emergency room for further evaluation.  He denies any anginal type complaints.  He did not have miles syncope.  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  He also plays golf.  When he does not have tachypalpitations he has no history of angina or any prior history of cardiac disease.  He states that he has had these kinds of episodes of tachypalpitations over the past 10 to 15 years.  Sometimes he will have 2-3 episodes a month and then he will not have any for couple of years.    Review of System  A 14 point review of systems was negative except as was stated in the HPI    History  History reviewed. No pertinent past medical history.  Past Surgical History:   Procedure Laterality Date    WISDOM TOOTH EXTRACTION       History reviewed. No pertinent family history.  Social History     Tobacco Use    Smoking status: Every Day     Current packs/day: 1.00     Types: Cigarettes   Substance Use Topics    Alcohol use: Yes     Comment: 20 beers/wk    Drug use: Not Currently     (Not in a hospital admission)    Scheduled Meds:     Continuous Infusions:     PRN Meds:    sodium chloride  Allergies:  Penicillins    Objective    Vital Sign Min/Max for last 24 hours  Temp  Min: 98 °F (36.7 °C)  Max: 98 °F (36.7 °C)   BP  Min: 126/97  Max: 158/98   Pulse  Min: 86  Max: 112   Resp  Min: 20  Max: 20   SpO2  Min: 94 %  Max: 98 %   No data recorded   Weight  Min: 99.8 kg (220 lb)  Max: 99.8 kg (220 lb)     Flowsheet Rows      Flowsheet Row First Filed Value   Admission Height 188 cm (74\") Documented at 05/18/2024 1158   Admission Weight 99.8 kg (220 lb) Documented at 05/18/2024 1158      "          Physical Exam:  General Appearance: Alert, appears stated age and cooperative  Lungs: Clear to ascultation  Heart:: RRR  No Murmurs, Rubs or Gallops  Abdomen: Soft and nontender with adequate bowel sounds. No organomegaly  Extremities: No cyanosis, clubbing or edema  Pulses: Pulses palpable and equal bilaterally  Skin: Warm and dry with no rash  Psych: Normal    Results Review:   I reviewed the patient's new clinical results.  Results from last 7 days   Lab Units 05/18/24  1210   WBC 10*3/mm3 7.20   HEMOGLOBIN g/dL 15.1   HEMATOCRIT % 45.2   PLATELETS 10*3/mm3 202     Results from last 7 days   Lab Units 05/18/24  1210   SODIUM mmol/L 138   POTASSIUM mmol/L 3.8   CHLORIDE mmol/L 103   CO2 mmol/L 20.0*   BUN mg/dL 13   CREATININE mg/dL 1.19   GLUCOSE mg/dL 179*   CALCIUM mg/dL 8.9     Lab Results   Lab Value Date/Time    TROPONINT 36 (H) 05/18/2024 1404    TROPONINT 16 05/18/2024 1210                   EKG and chest x-ray both normal      * No active hospital problems. *      Impression      Tachypalpitations/probable SVT  Tobacco use  Hypertension  Type 2 diabetes mellitus  Hyperlipidemia  Borderline HST elevation probably demand        Plan     Admit to telemetry for observation  Begin beta-blocker  Check echocardiography  Monitor telemetry for recurrent arrhythmia  Would plan outpatient stress testing as screening  for ischemic heart disease      I discussed the patient's findings and my recommendations with patient    Karl Davidson MD   05/18/24  16:10 EDT              Electronically signed by Karl Davidson MD at 05/18/24 1615          Emergency Department Notes        Fatou Estrada RN at 05/18/24 1644           Sean Rodrigues    Nursing Report ED to Floor:  Mental status: A&Ox4  Ambulatory status: self  Oxygen Therapy:  RA  Cardiac Rhythm: NSR/sinus tach  Admitted from: ED  Safety Concerns:  falls  Social Issues: none  ED Room #:  10    ED Nurse Phone Extension - [phone] or may call  [phone].      HPI:   Chief Complaint   Patient presents with    Palpitations       Past Medical History:  History reviewed. No pertinent past medical history.     Past Surgical History:  Past Surgical History:   Procedure Laterality Date    WISDOM TOOTH EXTRACTION          Admitting Doctor:   No admitting provider for patient encounter.    Consulting Provider(s):  Consults       Date and Time Order Name Status Description    5/18/2024  4:25 PM Inpatient Hospitalist Consult               Admitting Diagnosis:   The primary encounter diagnosis was Rapid palpitations. Diagnoses of Chest pain, unspecified type, Shortness of breath, and Elevated troponin were also pertinent to this visit.    Most Recent Vitals:   Vitals:    05/18/24 1400 05/18/24 1430 05/18/24 1500 05/18/24 1600   BP: 144/90 138/93 141/97 158/98   BP Location:       Patient Position:       Pulse: 86 88 87 93   Resp:       Temp:       TempSrc:       SpO2: 97% 96% 94% 97%   Weight:       Height:           Active LDAs/IV Access:   Lines, Drains & Airways       Active LDAs       Name Placement date Placement time Site Days    Peripheral IV Anterior;Distal;Right;Upper Arm --  --  Arm  --                    Labs (abnormal labs have a star):   Labs Reviewed   COMPREHENSIVE METABOLIC PANEL - Abnormal; Notable for the following components:       Result Value    Glucose 179 (*)     CO2 20.0 (*)     All other components within normal limits    Narrative:     GFR Normal >60  Chronic Kidney Disease <60  Kidney Failure <15     CBC WITH AUTO DIFFERENTIAL - Abnormal; Notable for the following components:    Immature Grans % 0.6 (*)     All other components within normal limits   HIGH SENSITIVITIY TROPONIN T 2HR - Abnormal; Notable for the following components:    HS Troponin T 36 (*)     Troponin T Delta 20 (*)     All other components within normal limits    Narrative:     High Sensitive Troponin T Reference Range:  <14.0 ng/L- Negative Female for AMI  <22.0 ng/L- Negative  Male for AMI  >=14 - Abnormal Female indicating possible myocardial injury.  >=22 - Abnormal Male indicating possible myocardial injury.   Clinicians would have to utilize clinical acumen, EKG, Troponin, and serial changes to determine if it is an Acute Myocardial Infarction or myocardial injury due to an underlying chronic condition.        MAGNESIUM - Normal   TSH - Normal   BNP (IN-HOUSE) - Normal    Narrative:     This assay is used as an aid in the diagnosis of individuals suspected of having heart failure. It can be used as an aid in the diagnosis of acute decompensated heart failure (ADHF) in patients presenting with signs and symptoms of ADHF to the emergency department (ED). In addition, NT-proBNP of <300 pg/mL indicates ADHF is not likely.    Age Range Result Interpretation  NT-proBNP Concentration (pg/mL:      <50             Positive            >450                   Gray                 300-450                    Negative             <300    50-75           Positive            >900                  Gray                300-900                  Negative            <300      >75             Positive            >1800                  Gray                300-1800                  Negative            <300   TROPONIN - Normal    Narrative:     High Sensitive Troponin T Reference Range:  <14.0 ng/L- Negative Female for AMI  <22.0 ng/L- Negative Male for AMI  >=14 - Abnormal Female indicating possible myocardial injury.  >=22 - Abnormal Male indicating possible myocardial injury.   Clinicians would have to utilize clinical acumen, EKG, Troponin, and serial changes to determine if it is an Acute Myocardial Infarction or myocardial injury due to an underlying chronic condition.        D-DIMER, QUANTITATIVE - Normal    Narrative:     According to the assay 's published package insert, a normal (<0.50 MCGFEU/mL) D-dimer result in conjunction with a non-high clinical probability assessment, excludes deep  "vein thrombosis (DVT) and pulmonary embolism (PE) with high sensitivity.    D-dimer values increase with age and this can make VTE exclusion of an older population difficult. To address this, the American College of Physicians, based on best available evidence and recent guidelines, recommends that clinicians use age-adjusted D-dimer thresholds in patients greater than 50 years of age with: a) a low probability of PE who do not meet all Pulmonary Embolism Rule Out Criteria, or b) in those with intermediate probability of PE.   The formula for an age-adjusted D-dimer cut-off is \"age/100\".  For example, a 60 year old patient would have an age-adjusted cut-off of 0.60 MCGFEU/mL and an 80 year old 0.80 MCGFEU/mL.   RAINBOW DRAW    Narrative:     The following orders were created for panel order [place] Draw.  Procedure                               Abnormality         Status                     ---------                               -----------         ------                     Green Top (Gel)[[id number]]                                  Final result               Lavender Top[[id number]]                                     Final result               Gold Top - SST[[id number]]                                   Final result               Kan Top[[id number]]                                         Final result               Light Blue Top[[id number]]                                   Final result                 Please view results for these tests on the individual orders.   CBC AND DIFFERENTIAL    Narrative:     The following orders were created for panel order CBC & Differential.  Procedure                               Abnormality         Status                     ---------                               -----------         ------                     CBC Auto Differential[[id number]]        Abnormal            Final result                 Please view results for these tests on the individual orders.   GREEN TOP   LAVENDER " TOP   GOLD TOP - SST   GRAY TOP   LIGHT BLUE TOP       Meds Given in ED:   Medications   sodium chloride 0.9 % flush 10 mL (has no administration in time range)   nitroglycerin (NITROSTAT) SL tablet 0.4 mg (has no administration in time range)   sodium chloride 0.9 % flush 10 mL (has no administration in time range)   sodium chloride 0.9 % flush 10 mL (has no administration in time range)   sodium chloride 0.9 % infusion 40 mL (has no administration in time range)   sennosides-docusate (PERICOLACE) 8.6-50 MG per tablet 2 tablet (has no administration in time range)     And   polyethylene glycol (MIRALAX) packet 17 g (has no administration in time range)     And   bisacodyl (DULCOLAX) EC tablet 5 mg (has no administration in time range)     And   bisacodyl (DULCOLAX) suppository 10 mg (has no administration in time range)   aspirin EC tablet 81 mg (has no administration in time range)   rosuvastatin (CRESTOR) tablet 20 mg (has no administration in time range)   metoprolol tartrate (LOPRESSOR) tablet 25 mg (has no administration in time range)           Last NIH score:                                                          Dysphagia screening results:  Patient Factors Component (Dysphagia:Stroke or Rule-out)  Best Eye Response: 4-->(E4) spontaneous (05/18/24 1324)  Best Motor Response: 6-->(M6) obeys commands (05/18/24 1324)  Best Verbal Response: 5-->(V5) oriented (05/18/24 1324)  Umberto Coma Scale Score: 15 (05/18/24 1324)     Umberto Coma Scale:  No data recorded     CIWA:        Restraint Type:            Isolation Status:  No active isolations          Electronically signed by Fatou Estrada RN at 05/18/24 3145       Efraín Dukes MD at 05/18/24 1202          Subjective   History of Present Illness  Mr. Rodrigues presents by ambulance with chest pain and shortness of breath and tachypalpitations.  He reports that he felt a skipped beat and then had to catch his breath.  Shortly after that his heart began  beating very quickly.  He developed shortness of breath.  He developed chest pain which she describes as a sensation of not being able to get a deep enough breath.  He tells me this has happened multiple times over a 10 or 15-year period.  He tells me he still feels dizzy although the feeling of rapid heart rate and shortness of breath are gone.  He denies any family history of heart trouble.  He tells me he takes a pill for high cholesterol and diabetes as well as Zoloft.  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      Review of Systems    History reviewed. No pertinent past medical history.    Allergies   Allergen Reactions    Penicillins Rash       Past Surgical History:   Procedure Laterality Date    WISDOM TOOTH EXTRACTION         History reviewed. No pertinent family history.    Social History     Socioeconomic History    Marital status:    Tobacco Use    Smoking status: Every Day     Current packs/day: 1.00     Types: Cigarettes   Vaping Use    Vaping status: Never Used   Substance and Sexual Activity    Alcohol use: Yes     Comment: 20 beers/wk    Drug use: Not Currently           Objective   Physical Exam  Vitals and nursing note reviewed.   Constitutional:       General: He is not in acute distress.     Appearance: Normal appearance.   HENT:      Head: Normocephalic and atraumatic.      Nose: Nose normal. No congestion or rhinorrhea.   Eyes:      General: No scleral icterus.     Conjunctiva/sclera: Conjunctivae normal.   Neck:      Comments: No JVD   Cardiovascular:      Rate and Rhythm: Regular rhythm. Tachycardia present.      Heart sounds: No murmur heard.     No friction rub.   Pulmonary:      Breath sounds: Normal breath sounds. No wheezing or rales.      Comments: He is tachypneic and taking deep breaths  Abdominal:      General: Bowel sounds are normal.      Palpations: Abdomen is soft.      Tenderness: There is no abdominal tenderness. There is no guarding or rebound.   Musculoskeletal:          General: No tenderness.      Cervical back: Normal range of motion and neck supple.      Right lower leg: No edema.      Left lower leg: No edema.   Skin:     General: Skin is warm and dry.      Coloration: Skin is not pale.      Findings: No erythema.   Neurological:      General: No focal deficit present.      Mental Status: He is alert and oriented to person, place, and time.      Motor: No weakness.      Coordination: Coordination normal.   Psychiatric:         Mood and Affect: Mood normal.         Behavior: Behavior normal.         Thought Content: Thought content normal.         Procedures          ED Course  ED Course as of 05/19/24 0655   Sat May 18, 2024   1204 Reviewed electronic medical record.  Reviewed the rhythm strip EMS brought.  This shows only sinus tachycardia. [DT]   1334 He remains in normal sinus rhythm without complaint.  I spoke with him and his friend about follow-up in the [facility] heart [facility] valve clinic for further evaluation.  Follow-up with his PCP.  Will discharge as long as second troponin is reassuring [DT]   1523 I discussed the case with Dr. Dukes regarding the change in trop. I messaged with the hospitalist.  I talked with the patient and advised him of the findings and the plan for admission.  The patient was initially hesitant but after further discussion is agreeable. [RS]      ED Course User Index  [DT] Efraín Dukes MD  [RS] Aleksander Cummings MD                                             Medical Decision Making  Please see course notes.  I ordered and interpreted multiple labs including troponins several hours apart.  Observed him on a monitor for several hours.  Had multiple reevaluations.  Consulted cardiology who admitted him to the hospital    Problems Addressed:  Chest pain, unspecified type: complicated acute illness or injury  Elevated troponin: complicated acute illness or injury that poses a threat to life or bodily functions  Rapid palpitations: complicated  acute illness or injury  Shortness of breath: complicated acute illness or injury    Amount and/or Complexity of Data Reviewed  Labs: ordered. Decision-making details documented in ED Course.  Radiology: ordered. Decision-making details documented in ED Course.  ECG/medicine tests: ordered. Decision-making details documented in ED Course.    Risk  Prescription drug management.  Decision regarding hospitalization.        Final diagnoses:   Rapid palpitations   Chest pain, unspecified type   Shortness of breath   Elevated troponin       ED Disposition  ED Disposition       ED Disposition   Decision to Admit    Condition   --    Comment   Level of Care: Telemetry [5]   Diagnosis: SVT (supraventricular tachycardia) [202906]   Certification: I Certify That Inpatient Hospital Services Are Medically Necessary For Greater Than 2 Midnights                 No follow-up provider specified.       Medication List      No changes were made to your prescriptions during this visit.            Efraín Dukes MD  05/19/24 0655      Electronically signed by Efraín Dukes MD at 05/19/24 0655       Vital Signs (last 3 days) before discharge       Date/Time Temp Temp src Pulse Resp BP Patient Position SpO2    05/19/24 0958 -- -- 86 -- 119/67 -- --    05/19/24 0810 97.7 (36.5) Oral 61 16 119/67 Lying 95    05/19/24 0505 -- Oral 67 16 137/81 Lying --    05/18/24 2350 -- Oral 75 18 137/84 Lying 96    05/18/24 1847 97.7 (36.5) Oral 87 18 144/98 Sitting --    05/18/24 1731 97.7 (36.5) Oral 101 16 131/105 Sitting 96    05/18/24 1700 -- -- 87 -- 149/97 -- 94    05/18/24 1630 -- -- 94 -- 149/105 -- --    05/18/24 1600 -- -- 93 -- 158/98 -- 97    05/18/24 1500 -- -- 87 -- 141/97 -- 94    05/18/24 1430 -- -- 88 -- 138/93 -- 96    05/18/24 1400 -- -- 86 -- 144/90 -- 97    05/18/24 1230 -- -- 109 -- 131/90 -- 94    05/18/24 1200 98 (36.7) Oral 112 -- 126/97 -- 98    05/18/24 1158 -- -- 109 20 152/101 Lying 97          Oxygen Therapy (last 3  days) before discharge       Date/Time SpO2 Device (Oxygen Therapy) Flow (L/min) Oxygen Concentration (%) ETCO2 (mmHg)    05/19/24 0810 95 room air -- -- --    05/19/24 0554 -- room air -- -- --    05/19/24 0505 -- room air -- -- --    05/19/24 0400 -- room air -- -- --    05/19/24 0200 -- room air -- -- --    05/19/24 0020 -- room air -- -- --    05/18/24 2350 96 room air -- -- --    05/18/24 2215 -- room air -- -- --    05/18/24 2011 -- room air -- -- --    05/18/24 1847 -- room air -- -- --    05/18/24 1740 -- room air -- -- --    05/18/24 1731 96 room air -- -- --    05/18/24 1700 94 -- -- -- --    05/18/24 1600 97 -- -- -- --    05/18/24 1500 94 -- -- -- --    05/18/24 1430 96 -- -- -- --    05/18/24 1400 97 -- -- -- --    05/18/24 1230 94 -- -- -- --    05/18/24 1200 98 -- -- -- --    05/18/24 1158 97 room air -- -- --          Lines, Drains & Airways       Active LDAs       None                  No current facility-administered medications for this encounter.     Current Outpatient Medications   Medication Sig Dispense Refill    aspirin 81 MG EC tablet Take 1 tablet by mouth Daily. 90 tablet 3    rosuvastatin (CRESTOR) 20 MG tablet Take 1 tablet by mouth Every Night. 90 tablet 3    citalopram (CeleXA) 40 MG tablet Take 40 mg by mouth Daily.      levocetirizine (XYZAL) 5 MG tablet Take 5 mg by mouth Every Evening.      METFORMIN HCL PO Take  by mouth.      metoprolol succinate XL (TOPROL-XL) 50 MG 24 hr tablet Take 1 tablet by mouth Daily. 30 tablet 11    omeprazole (priLOSEC) 20 MG capsule Take 20 mg by mouth Daily.      Sertraline HCl (ZOLOFT PO) Take  by mouth.       Lab Results (last 72 hours)       Procedure Component Value Units Date/Time    High Sensitivity Troponin T 2Hr [[id number]]  (Abnormal) Collected: 05/18/24 1404    Specimen: Blood Updated: 05/18/24 1440     HS Troponin T 36 ng/L      Troponin T Delta 20 ng/L     Narrative:      High Sensitive Troponin T Reference Range:  <14.0 ng/L- Negative  Female for AMI  <22.0 ng/L- Negative Male for AMI  >=14 - Abnormal Female indicating possible myocardial injury.  >=22 - Abnormal Male indicating possible myocardial injury.   Clinicians would have to utilize clinical acumen, EKG, Troponin, and serial changes to determine if it is an Acute Myocardial Infarction or myocardial injury due to an underlying chronic condition.         TSH [[id number]]  (Normal) Collected: 05/18/24 1210    Specimen: Blood Updated: 05/18/24 1251     TSH 1.700 uIU/mL     BNP [[id number]]  (Normal) Collected: 05/18/24 1210    Specimen: Blood Updated: 05/18/24 1251     proBNP 68.2 pg/mL     Narrative:      This assay is used as an aid in the diagnosis of individuals suspected of having heart failure. It can be used as an aid in the diagnosis of acute decompensated heart failure (ADHF) in patients presenting with signs and symptoms of ADHF to the emergency department (ED). In addition, NT-proBNP of <300 pg/mL indicates ADHF is not likely.    Age Range Result Interpretation  NT-proBNP Concentration (pg/mL:      <50             Positive            >450                   Gray                 300-450                    Negative             <300    50-75           Positive            >900                  Gray                300-900                  Negative            <300      >75             Positive            >1800                  Gray                300-1800                  Negative            <300    High Sensitivity Troponin T [[id number]]  (Normal) Collected: 05/18/24 1210    Specimen: Blood Updated: 05/18/24 1251     HS Troponin T 16 ng/L     Narrative:      High Sensitive Troponin T Reference Range:  <14.0 ng/L- Negative Female for AMI  <22.0 ng/L- Negative Male for AMI  >=14 - Abnormal Female indicating possible myocardial injury.  >=22 - Abnormal Male indicating possible myocardial injury.   Clinicians would have to utilize clinical acumen, EKG, Troponin, and serial changes to determine  "if it is an Acute Myocardial Infarction or myocardial injury due to an underlying chronic condition.         D-dimer, Quantitative [[id number]]  (Normal) Collected: 05/18/24 1210    Specimen: Blood Updated: 05/18/24 1249     D-Dimer, Quantitative <0.27 MCGFEU/mL     Narrative:      According to the assay 's published package insert, a normal (<0.50 MCGFEU/mL) D-dimer result in conjunction with a non-high clinical probability assessment, excludes deep vein thrombosis (DVT) and pulmonary embolism (PE) with high sensitivity.    D-dimer values increase with age and this can make VTE exclusion of an older population difficult. To address this, the [employer], based on best available evidence and recent guidelines, recommends that clinicians use age-adjusted D-dimer thresholds in patients greater than 50 years of age with: a) a low probability of PE who do not meet all Pulmonary Embolism Rule Out Criteria, or b) in those with intermediate probability of PE.   The formula for an age-adjusted D-dimer cut-off is \"age/100\".  For example, a 60 year old patient would have an age-adjusted cut-off of 0.60 MCGFEU/mL and an 80 year old 0.80 MCGFEU/mL.    Comprehensive Metabolic Panel [[id number]]  (Abnormal) Collected: 05/18/24 1210    Specimen: Blood Updated: 05/18/24 1248     Glucose 179 mg/dL      BUN 13 mg/dL      Creatinine 1.19 mg/dL      Sodium 138 mmol/L      Potassium 3.8 mmol/L      Comment: Slight hemolysis detected by analyzer. Result may be falsely elevated.        Chloride 103 mmol/L      CO2 20.0 mmol/L      Calcium 8.9 mg/dL      Total Protein 6.9 g/dL      Albumin 4.1 g/dL      ALT (SGPT) 15 U/L      AST (SGOT) 20 U/L      Alkaline Phosphatase 109 U/L      Total Bilirubin 0.3 mg/dL      Globulin 2.8 gm/dL      Comment: Calculated Result        A/G Ratio 1.5 g/dL      BUN/Creatinine Ratio 10.9     Anion Gap 15.0 mmol/L      eGFR 73.5 mL/min/1.73     Narrative:      GFR Normal " >60  Chronic Kidney Disease <60  Kidney Failure <15      Magnesium [[id number]]  (Normal) Collected: 05/18/24 1210    Specimen: Blood Updated: 05/18/24 1248     Magnesium 1.9 mg/dL     [place] Draw [[id number]] Collected: 05/18/24 1210    Specimen: Blood Updated: 05/18/24 1230    Narrative:      The following orders were created for panel order [place] Draw.  Procedure                               Abnormality         Status                     ---------                               -----------         ------                     Green Top (Gel)[[id number]]                                  Final result               Lavender Top[[id number]]                                     Final result               Gold Top - SST[[id number]]                                   Final result               Kan Top[[id number]]                                         Final result               Light Blue Top[[id number]]                                   Final result                 Please view results for these tests on the individual orders.    Green Top (Gel) [[id number]] Collected: 05/18/24 1210    Specimen: Blood Updated: 05/18/24 1230     Extra Tube Hold for add-ons.     Comment: Auto resulted.       Gold Top - SST [[id number]] Collected: 05/18/24 1210    Specimen: Blood Updated: 05/18/24 1230     Extra Tube Hold for add-ons.     Comment: Auto resulted.       Gray Top [[id number]] Collected: 05/18/24 1210    Specimen: Blood Updated: 05/18/24 1230     Extra Tube Hold for add-ons.     Comment: Auto resulted.       Lavender Top [[id number]] Collected: 05/18/24 1210    Specimen: Blood Updated: 05/18/24 1230     Extra Tube hold for add-on     Comment: Auto resulted       Light Blue Top [[id number]] Collected: 05/18/24 1210    Specimen: Blood Updated: 05/18/24 1230     Extra Tube Hold for add-ons.     Comment: Auto resulted       CBC & Differential [[id number]]  (Abnormal) Collected: 05/18/24 1210    Specimen: Blood Updated: 05/18/24 1226     Narrative:      The following orders were created for panel order CBC & Differential.  Procedure                               Abnormality         Status                     ---------                               -----------         ------                     CBC Auto Differential[[id number]]        Abnormal            Final result                 Please view results for these tests on the individual orders.    CBC Auto Differential [[id number]]  (Abnormal) Collected: 05/18/24 1210    Specimen: Blood Updated: 05/18/24 1226     WBC 7.20 10*3/mm3      RBC 4.91 10*6/mm3      Hemoglobin 15.1 g/dL      Hematocrit 45.2 %      MCV 92.1 fL      MCH 30.8 pg      MCHC 33.4 g/dL      RDW 13.1 %      RDW-SD 44.5 fl      MPV 10.6 fL      Platelets 202 10*3/mm3      Neutrophil % 71.9 %      Lymphocyte % 19.6 %      Monocyte % 6.1 %      Eosinophil % 1.1 %      Basophil % 0.7 %      Immature Grans % 0.6 %      Neutrophils, Absolute 5.18 10*3/mm3      Lymphocytes, Absolute 1.41 10*3/mm3      Monocytes, Absolute 0.44 10*3/mm3      Eosinophils, Absolute 0.08 10*3/mm3      Basophils, Absolute 0.05 10*3/mm3      Immature Grans, Absolute 0.04 10*3/mm3      nRBC 0.0 /100 WBC           Imaging Results (Last 72 Hours)       Procedure Component Value Units Date/Time    XR Chest 1 View [[id number]] Collected: 05/18/24 1235     Updated: 05/18/24 1239    Narrative:      XR CHEST 1 VW    Date of Exam: 5/18/2024 11:56 AM EDT    Indication: Dysrhythmia triage protocol    Comparison: None available.    Findings:  Cardiomediastinal silhouette is unremarkable.  No airspace disease, pneumothorax, nor pleural effusion. No acute osseous abnormality identified.      Impression:      Impression:  No acute process identified      Electronically Signed: Efraín Manuel MD    5/18/2024 12:36 PM EDT    Workstation ID: [id number]          ECG/EMG Results (last 72 hours)       ** No results found for the last 72 hours. **          Physician Progress Notes (last 72  hours)  Notes from 05/17/24 1431 through 05/20/24 1431   No notes of this type exist for this encounter.       Consult Notes (last 72 hours)  Notes from 05/17/24 1431 through 05/20/24 1431   No notes of this type exist for this encounter.          Discharge Summary        Karl Davidson MD at 05/19/24 0844          Sean Rodrigues  1970      Discharge summary    Admit date: 5/18/2024  DC date: 5/19/2024    Admit history: 53-year-old -American male presents to [facility] ED with tachypalpitations lightheadedness, probable SVT.  Admitted for observation.    Hospital course: Mr. Rodrigues is a 53-year-old -American male admitted overnight for probable SVT.  He also has PMHx HTN, DM2, HLP, ongoing tobacco abuse.  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  He reports that he has had these episodes over the last 10 to 15 years sometimes 2-3 episodes a month.  He was started on a beta-blocker here at the hospital and underwent echocardiogram.  Echocardiogram was reviewed at bedside showing normal LV function without valvular or wall motion abnormalities.    Is felt the patient could be discharged home with close monitoring.  We will have him follow-up with Dr. Davidson in 4 weeks with EKG and nuclear MPS on same day as appointment.  We are also placing referral to EP, Dr. Yannick Barboza, for evaluation for possible ablation.  Patient will be educated on smoking cessation education prior to discharge.  We recommend the following meds at time of discharge: Toprol-XL 50 mg p.o. daily, ASA 81 mg daily, Crestor 20 mg nightly, omeprazole 20 mg daily, Celexa 40 mg daily, Xyzal 5 mg nightly, metformin as directed.    This is a summary on Mr. Sean Rodrigues.  Please see chart for further details.    Ruthy Aldana PA-C  Electronically signed by ADRYAN Houston, 05/19/24, 8:49 AM EDT.      Electronically signed by Karl Davidson MD at 05/19/24 0852

## 2024-05-22 PROBLEM — I10 ESSENTIAL HYPERTENSION: Status: ACTIVE | Noted: 2024-05-22

## 2024-05-22 NOTE — PROGRESS NOTES
Electrophysiology Clinic Consult     Sean Rodrigues  [id number]  1970    Referring Provider: Jovan, ADRYAN Gilman   PCP: Provider, No Known  [facility] [place] [id number]    Date of Service: 05/23/24    Chief Complaint   Patient presents with    Rapid Heart Rate     Problem List  SVT  Echo, 5/19/2024: EF 61-65%  Hypertension  Hyperlipidemia  Type 2 diabetes  Tobacco abuse    History of Present Illness  Sean Rodrigues is a 53 y.o. male who presents to my electrophysiology clinic for evaluation of SVT.  Patient had a recent SVT episode which required hospital observation; evaluated by Dr. Davidson and now referred to my office for further evaluation.  Patient notes that he has been having SVT for the past 15 years-it happens once or twice a year.  Previously they were easily self terminating but the most recent episode lasted a long time and he was very symptomatic: Lightheadedness and palpitation.  He did not notice any neck pounding.  Previously his episodes would terminate when he is put his head between his legs and crouch over- never tried vagal maneuver.     Review of Systems   Constitutional:  Positive for fatigue. Negative for activity change and fever.   Respiratory:  Positive for shortness of breath. Negative for chest tightness.    Cardiovascular:  Positive for palpitations. Negative for chest pain and leg swelling.   Gastrointestinal:  Negative for constipation and diarrhea.   Genitourinary:  Negative for decreased urine volume and difficulty urinating.   Skin:  Negative for wound.   Neurological:  Positive for weakness. Negative for dizziness, syncope and light-headedness.   Psychiatric/Behavioral:  Negative for suicidal ideas.        Outpatient Medications Marked as Taking for the 5/23/24 encounter (Office Visit) with Yannick Barboza MD   Medication Sig Dispense Refill    atorvastatin (LIPITOR) 10 MG tablet Take 1 tablet by mouth Daily.      lisinopril (PRINIVIL,ZESTRIL) 10  "MG tablet Take 1 tablet by mouth Daily.      METFORMIN HCL PO Take  by mouth.      omeprazole (priLOSEC) 20 MG capsule Take 1 capsule by mouth Daily.      Sertraline HCl (ZOLOFT PO) Take  by mouth.      [DISCONTINUED] rosuvastatin (CRESTOR) 20 MG tablet Take 1 tablet by mouth Every Night. 90 tablet 3       Physical Exam  Vitals:    05/23/24 1347   BP: 148/96   BP Location: Right arm   Patient Position: Sitting   Pulse: 98   SpO2: 97%   Weight: 94.3 kg (208 lb)   Height: 188 cm (74\")     GENERAL: Well-developed, well-nourished patient in no acute distress.  HEENT: NC, AC, PERRLA. MMM  NECK: No JVD. No carotid bruits auscultated.  LUNGS: Clear to auscultation bilaterally.  CARDIOVASCULAR: RRR No murmurs, gallops or rubs noted.   ABDOMEN: Soft, nontender. Positive bowel sounds.  MUSCULOSKELETAL: No gross deformities. No clubbing, cyanosis  EXT: pulses intact, No edema  SKIN: Pink, warm  Neuro: Nonfocal exam. Gait intact    Diagnostic Data  Procedures    Lab Results   Component Value Date    GLUCOSE 179 (H) 05/18/2024    CALCIUM 8.9 05/18/2024     05/18/2024    K 3.8 05/18/2024    CO2 20.0 (L) 05/18/2024     05/18/2024    BUN 13 05/18/2024    CREATININE 1.19 05/18/2024    BCR 10.9 05/18/2024    ANIONGAP 15.0 05/18/2024     Lab Results   Component Value Date    WBC 7.20 05/18/2024    HGB 15.1 05/18/2024    HCT 45.2 05/18/2024    MCV 92.1 05/18/2024     05/18/2024     No results found for: \"INR\", \"PROTIME\"  Lab Results   Component Value Date    TSH 1.700 05/18/2024         I personally viewed and interpreted the patient's EKG/Telemetry/lab data      Assessment and Plan   Diagnoses and all orders for this visit:    1. SVT (supraventricular tachycardia) (Primary)    2. Essential hypertension        SVT  -Hospitalization for palpitations 5/18/2024  -Recurring SVT episodes with progressively worsening symptoms; unfortunately we do not have any documented SVT EKG but patient is willing to undergo EP study " for further evaluation  -After extensive conversation regarding risks, benefits, or alternatives to the therapy, patient elected to proceed with the EPS +/- SVT ablation procedure   - EPS +/- SVT ablation with Carto   - hold off on initiating metoprolol     Hypertension  -Well controlled.  Continue current medications    Body mass index is 26.71 kg/m².    Follow Up  Return for Follow up after Procedure.  ACP discussion was declined by the patient. Patient has an advance directive (not in EMR), copy requested.    Thank you for allowing me to participate in the care of your patient. Please to not hesitate to contact me with additional questions or concerns.        Yannick Barboza MD [facility]  Cardiac Electrophysiologist  [facility] / [facility]

## 2024-05-23 ENCOUNTER — OFFICE VISIT (OUTPATIENT)
Dept: CARDIOLOGY | Facility: CLINIC | Age: 54
End: 2024-05-23
Payer: MEDICAID

## 2024-05-23 VITALS
OXYGEN SATURATION: 97 % | BODY MASS INDEX: 26.69 KG/M2 | DIASTOLIC BLOOD PRESSURE: 96 MMHG | WEIGHT: 208 LBS | SYSTOLIC BLOOD PRESSURE: 148 MMHG | HEIGHT: 74 IN | HEART RATE: 98 BPM

## 2024-05-23 DIAGNOSIS — I47.10 SVT (SUPRAVENTRICULAR TACHYCARDIA): Primary | Chronic | ICD-10-CM

## 2024-05-23 DIAGNOSIS — I47.10 SVT (SUPRAVENTRICULAR TACHYCARDIA): Primary | ICD-10-CM

## 2024-05-23 DIAGNOSIS — I10 ESSENTIAL HYPERTENSION: Chronic | ICD-10-CM

## 2024-05-23 RX ORDER — LISINOPRIL 10 MG/1
1 TABLET ORAL DAILY
COMMUNITY
Start: 2024-02-06

## 2024-05-23 RX ORDER — ATORVASTATIN CALCIUM 10 MG/1
1 TABLET, FILM COATED ORAL DAILY
COMMUNITY
Start: 2024-02-06

## 2024-05-29 ENCOUNTER — PREP FOR SURGERY (OUTPATIENT)
Dept: OTHER | Facility: HOSPITAL | Age: 54
End: 2024-05-29
Payer: MEDICAID

## 2024-05-29 DIAGNOSIS — I47.10 SVT (SUPRAVENTRICULAR TACHYCARDIA): Primary | ICD-10-CM

## 2024-05-29 RX ORDER — ONDANSETRON 2 MG/ML
4 INJECTION INTRAMUSCULAR; INTRAVENOUS EVERY 6 HOURS PRN
OUTPATIENT
Start: 2024-05-29

## 2024-05-29 RX ORDER — ACETAMINOPHEN 325 MG/1
650 TABLET ORAL EVERY 4 HOURS PRN
OUTPATIENT
Start: 2024-05-29

## 2024-05-29 RX ORDER — NITROGLYCERIN 0.4 MG/1
0.4 TABLET SUBLINGUAL
OUTPATIENT
Start: 2024-05-29

## 2024-05-29 RX ORDER — SODIUM CHLORIDE 0.9 % (FLUSH) 0.9 %
10 SYRINGE (ML) INJECTION AS NEEDED
OUTPATIENT
Start: 2024-05-29

## 2024-05-29 RX ORDER — SODIUM CHLORIDE 9 MG/ML
40 INJECTION, SOLUTION INTRAVENOUS AS NEEDED
OUTPATIENT
Start: 2024-05-29

## 2024-05-29 RX ORDER — SODIUM CHLORIDE 0.9 % (FLUSH) 0.9 %
10 SYRINGE (ML) INJECTION EVERY 12 HOURS SCHEDULED
OUTPATIENT
Start: 2024-05-29

## 2024-06-17 ENCOUNTER — PRE-ADMISSION TESTING (OUTPATIENT)
Dept: PREADMISSION TESTING | Facility: HOSPITAL | Age: 54
End: 2024-06-17
Payer: MEDICAID

## 2024-06-17 DIAGNOSIS — I47.10 SVT (SUPRAVENTRICULAR TACHYCARDIA): ICD-10-CM

## 2024-06-17 LAB
ANION GAP SERPL CALCULATED.3IONS-SCNC: 8 MMOL/L (ref 5–15)
BUN SERPL-MCNC: 11 MG/DL (ref 6–20)
BUN/CREAT SERPL: 10.4 (ref 7–25)
CALCIUM SPEC-SCNC: 9 MG/DL (ref 8.6–10.5)
CHLORIDE SERPL-SCNC: 102 MMOL/L (ref 98–107)
CO2 SERPL-SCNC: 30 MMOL/L (ref 22–29)
CREAT SERPL-MCNC: 1.06 MG/DL (ref 0.76–1.27)
DEPRECATED RDW RBC AUTO: 43.1 FL (ref 37–54)
EGFRCR SERPLBLD CKD-EPI 2021: 84.4 ML/MIN/1.73
ERYTHROCYTE [DISTWIDTH] IN BLOOD BY AUTOMATED COUNT: 12.5 % (ref 12.3–15.4)
GLUCOSE SERPL-MCNC: 131 MG/DL (ref 65–99)
HCT VFR BLD AUTO: 46.7 % (ref 37.5–51)
HGB BLD-MCNC: 15.2 G/DL (ref 13–17.7)
MCH RBC QN AUTO: 30.5 PG (ref 26.6–33)
MCHC RBC AUTO-ENTMCNC: 32.5 G/DL (ref 31.5–35.7)
MCV RBC AUTO: 93.6 FL (ref 79–97)
PLATELET # BLD AUTO: 206 10*3/MM3 (ref 140–450)
PMV BLD AUTO: 10.9 FL (ref 6–12)
POTASSIUM SERPL-SCNC: 4.1 MMOL/L (ref 3.5–5.2)
RBC # BLD AUTO: 4.99 10*6/MM3 (ref 4.14–5.8)
SODIUM SERPL-SCNC: 140 MMOL/L (ref 136–145)
WBC NRBC COR # BLD AUTO: 9.73 10*3/MM3 (ref 3.4–10.8)

## 2024-06-17 PROCEDURE — 80048 BASIC METABOLIC PNL TOTAL CA: CPT

## 2024-06-17 PROCEDURE — 36415 COLL VENOUS BLD VENIPUNCTURE: CPT

## 2024-06-17 PROCEDURE — 85027 COMPLETE CBC AUTOMATED: CPT

## 2024-06-17 NOTE — DISCHARGE INSTRUCTIONS
"Dear Patient,    Do NOT eat, drink, or smoke after midnight the night before your procedure.   Take your medications as instructed by your doctor.    Glasses and jewelry may be worn, but dentures must be removed prior to your procedure.    Leave any items you consider valuable at home.      MORNING of your Procedure, please bring the following:     -Photo ID and insurance card(s)    -ALL medications in their ORIGINAL CONTAINERS    -Co-pay and/or deductible required by your insurance   -Copy of living will or power of  document (if not brought to    Pre-Admission Testing department)   -CPAP mask and tubing, not your machine (if applicable)    -Relaxation aids (music, books, magazines)   -Skin Prep Instruction Sheet (if applicable)   -Relaxation Aids    Check in on the 2nd floor in the [address] [facility].  Your procedure will be performed in the cath lab or EP lab.  During your procedure, your family will wait in the cath lab waiting area where you checked in.      Need to make arrangements for transportation prior to discharge.    A handout regarding \"Heart Healthy Eating\" was provided today to encourage healthy eating habits.    Booklet published by [employer] was given in Pre-Admission testing.  This booklet is for informational purposes only.  If you have any questions about your procedure, please speak with your physician.      Please note:  If you are scheduled to have one of the following procedures: Pulmonary Vein Ablation, Lead Extraction, MitraClip, Cerebral Coilings or Embolization, please let your family know that after your procedure you will be going to recovery unit on the 2nd floor of the [facility].  When the physician is finished speaking with your family after your procedure is completed, your family will be directed or escorted to the surgery waiting area in the [facility].  This is where your family will wait until you are given a room assignment and then your family will be directed to the " appropriate unit.

## 2024-06-24 ENCOUNTER — HOSPITAL ENCOUNTER (OUTPATIENT)
Facility: HOSPITAL | Age: 54
Setting detail: HOSPITAL OUTPATIENT SURGERY
Discharge: HOME | End: 2024-06-24
Attending: INTERNAL MEDICINE | Admitting: INTERNAL MEDICINE
Payer: MEDICAID

## 2024-06-24 VITALS
SYSTOLIC BLOOD PRESSURE: 157 MMHG | RESPIRATION RATE: 13 BRPM | WEIGHT: 206.4 LBS | BODY MASS INDEX: 26.49 KG/M2 | DIASTOLIC BLOOD PRESSURE: 108 MMHG | HEIGHT: 74 IN | HEART RATE: 63 BPM | TEMPERATURE: 97.4 F | OXYGEN SATURATION: 96 %

## 2024-06-24 DIAGNOSIS — I47.10 SVT (SUPRAVENTRICULAR TACHYCARDIA): ICD-10-CM

## 2024-06-24 LAB
QT INTERVAL: 420 MS
QTC INTERVAL: 443 MS

## 2024-06-24 PROCEDURE — 99152 MOD SED SAME PHYS/QHP 5/>YRS: CPT | Performed by: INTERNAL MEDICINE

## 2024-06-24 PROCEDURE — 93623 PRGRMD STIMJ&PACG IV RX NFS: CPT | Performed by: INTERNAL MEDICINE

## 2024-06-24 PROCEDURE — C1894 INTRO/SHEATH, NON-LASER: HCPCS | Performed by: INTERNAL MEDICINE

## 2024-06-24 PROCEDURE — C1730 CATH, EP, 19 OR FEW ELECT: HCPCS | Performed by: INTERNAL MEDICINE

## 2024-06-24 PROCEDURE — 25010000002 LIDOCAINE 1 % SOLUTION: Performed by: INTERNAL MEDICINE

## 2024-06-24 PROCEDURE — 25010000002 ONDANSETRON PER 1 MG: Performed by: INTERNAL MEDICINE

## 2024-06-24 PROCEDURE — 25810000003 SODIUM CHLORIDE 0.9 % SOLUTION: Performed by: INTERNAL MEDICINE

## 2024-06-24 PROCEDURE — 93653 COMPRE EP EVAL TX SVT: CPT | Performed by: INTERNAL MEDICINE

## 2024-06-24 PROCEDURE — C1760 CLOSURE DEV, VASC: HCPCS | Performed by: INTERNAL MEDICINE

## 2024-06-24 PROCEDURE — C1732 CATH, EP, DIAG/ABL, 3D/VECT: HCPCS | Performed by: INTERNAL MEDICINE

## 2024-06-24 PROCEDURE — 99153 MOD SED SAME PHYS/QHP EA: CPT | Performed by: INTERNAL MEDICINE

## 2024-06-24 PROCEDURE — 25010000002 FENTANYL CITRATE (PF) 50 MCG/ML SOLUTION: Performed by: INTERNAL MEDICINE

## 2024-06-24 PROCEDURE — S0260 H&P FOR SURGERY: HCPCS | Performed by: INTERNAL MEDICINE

## 2024-06-24 PROCEDURE — 93005 ELECTROCARDIOGRAM TRACING: CPT | Performed by: INTERNAL MEDICINE

## 2024-06-24 PROCEDURE — C1766 INTRO/SHEATH,STRBLE,NON-PEEL: HCPCS | Performed by: INTERNAL MEDICINE

## 2024-06-24 PROCEDURE — 25010000002 MIDAZOLAM PER 1 MG: Performed by: INTERNAL MEDICINE

## 2024-06-24 RX ORDER — NITROGLYCERIN 0.4 MG/1
0.4 TABLET SUBLINGUAL
Status: DISCONTINUED | OUTPATIENT
Start: 2024-06-24 | End: 2024-06-24 | Stop reason: HOSPADM

## 2024-06-24 RX ORDER — LIDOCAINE HYDROCHLORIDE 10 MG/ML
INJECTION, SOLUTION INFILTRATION; PERINEURAL
Status: DISCONTINUED | OUTPATIENT
Start: 2024-06-24 | End: 2024-06-24 | Stop reason: HOSPADM

## 2024-06-24 RX ORDER — SODIUM CHLORIDE 9 MG/ML
INJECTION, SOLUTION INTRAVENOUS
Status: COMPLETED | OUTPATIENT
Start: 2024-06-24 | End: 2024-06-24

## 2024-06-24 RX ORDER — SODIUM CHLORIDE 9 MG/ML
40 INJECTION, SOLUTION INTRAVENOUS AS NEEDED
Status: DISCONTINUED | OUTPATIENT
Start: 2024-06-24 | End: 2024-06-24 | Stop reason: HOSPADM

## 2024-06-24 RX ORDER — ACETAMINOPHEN 325 MG/1
650 TABLET ORAL EVERY 4 HOURS PRN
Status: DISCONTINUED | OUTPATIENT
Start: 2024-06-24 | End: 2024-06-24 | Stop reason: HOSPADM

## 2024-06-24 RX ORDER — FENTANYL CITRATE 50 UG/ML
INJECTION, SOLUTION INTRAMUSCULAR; INTRAVENOUS
Status: DISCONTINUED | OUTPATIENT
Start: 2024-06-24 | End: 2024-06-24 | Stop reason: HOSPADM

## 2024-06-24 RX ORDER — SODIUM CHLORIDE 0.9 % (FLUSH) 0.9 %
10 SYRINGE (ML) INJECTION AS NEEDED
Status: DISCONTINUED | OUTPATIENT
Start: 2024-06-24 | End: 2024-06-24 | Stop reason: HOSPADM

## 2024-06-24 RX ORDER — MIDAZOLAM HYDROCHLORIDE 1 MG/ML
INJECTION INTRAMUSCULAR; INTRAVENOUS
Status: DISCONTINUED | OUTPATIENT
Start: 2024-06-24 | End: 2024-06-24 | Stop reason: HOSPADM

## 2024-06-24 RX ORDER — ONDANSETRON 2 MG/ML
INJECTION INTRAMUSCULAR; INTRAVENOUS
Status: DISCONTINUED | OUTPATIENT
Start: 2024-06-24 | End: 2024-06-24 | Stop reason: HOSPADM

## 2024-06-24 RX ORDER — ONDANSETRON 2 MG/ML
4 INJECTION INTRAMUSCULAR; INTRAVENOUS EVERY 6 HOURS PRN
Status: DISCONTINUED | OUTPATIENT
Start: 2024-06-24 | End: 2024-06-24 | Stop reason: HOSPADM

## 2024-06-24 RX ORDER — SODIUM CHLORIDE 0.9 % (FLUSH) 0.9 %
10 SYRINGE (ML) INJECTION EVERY 12 HOURS SCHEDULED
Status: DISCONTINUED | OUTPATIENT
Start: 2024-06-24 | End: 2024-06-24 | Stop reason: HOSPADM

## 2024-06-24 NOTE — H&P
[facility] at [facility]  HISTORY AND PHYSICAL    Sean Rodrigues  : 1970  MRN:[id number]    Date of Admission:2024    PCP: Provider, No Known    Chief Complaint: SVT    PROBLEM LIST:   SVT  Echo, 2024: EF 61-65%  15 year history of SVT  Hospital admission, 2024  Hypertension  Hyperlipidemia  Type 2 diabetes  Tobacco abuse    ALLERGIES:   Allergies   Allergen Reactions    Penicillins Anaphylaxis and Rash     As infant        HOME MEDICINES:   Prior to Admission Medications       Prescriptions Last Dose Informant Patient Reported? Taking?    Sertraline HCl (ZOLOFT PO)  Self Yes Yes    Take 100 mg by mouth Daily.            Subjective     HPI: Sean Rodrigues is a 53 y.o. male with a past medical history listed above presents to [facility] today for EP study +/- SVT RFA.  Patient has had SVT for the last 15 years a couple times a year.  Most recently he had to be admitted to the hospital in May.  He is symptomatic with palpitations and lightheadedness. Patient currently denies chest pain, shortness of breath, palpitations, LH, and syncope.      ROS: All systems have been reviewed and are negative with the exception of those mentioned in the HPI and problem list above.    Past Medical History:   Past Medical History:   Diagnosis Date    Deafness in left ear     Diabetes mellitus     prediabetic    GERD (gastroesophageal reflux disease)     garlic mostly    Hyperlipidemia     Hypertension     SVT (supraventricular tachycardia)       Surgical History:   Past Surgical History:   Procedure Laterality Date    INNER EAR SURGERY Left     x9 total surgeries    LASIK Bilateral     TONSILLECTOMY      WISDOM TOOTH EXTRACTION       Social History:   Social History     Socioeconomic History    Marital status:    Tobacco Use    Smoking status: Every Day     Current packs/day: 1.00     Types: Cigarettes    Smokeless tobacco: Never   Vaping Use     "Vaping status: Some Days    Substances: Nicotine, Flavoring    Devices: Pre-filled or refillable cartridge    Passive vaping exposure: Yes   Substance and Sexual Activity    Alcohol use: Yes     Comment: 20 beers/wk    Drug use: Not Currently    Sexual activity: Defer     Family History:   Family History   Problem Relation Age of Onset    Diabetes Mother     Cancer Father        Objective   BP (!) 162/105 (BP Location: Right arm, Patient Position: Sitting)   Pulse 70   Temp 97.4 °F (36.3 °C) (Temporal)   Resp 14   Ht 188 cm (74\")   Wt 93.6 kg (206 lb 6.4 oz)   SpO2 97%   BMI 26.50 kg/m²   No intake or output data in the 24 hours ending 06/24/24 0711    PHYSICAL EXAM:  CONSTITUTIONAL: Well nourished, cooperative, in no acute distress  HEENT: Normocephalic, atraumatic, PERRLA, no JVD  CARDIOVASCULAR:  Regular rhythm and normal rate, no murmur, gallop, rub.   RESPIRATORY: Clear to auscultation, normal respiratory effort, no wheezing, rales or ronchi, RA  EXTREMITIES: No gross deformities, no edema. Peripheral pulses are present and equal bilaterally  SKIN: Warm, dry. No bleeding, bruising or rash  NEUROLOGICAL: No focal deficits  PSYCHIATRIC: Normal mood and affect. Behavior is normal     Labs/Diagnostic Data  Results from last 7 days   Lab Units 06/17/24  0940   SODIUM mmol/L 140   POTASSIUM mmol/L 4.1   CHLORIDE mmol/L 102   CO2 mmol/L 30.0*   BUN mg/dL 11   CREATININE mg/dL 1.06   GLUCOSE mg/dL 131*   CALCIUM mg/dL 9.0         Results from last 7 days   Lab Units 06/17/24  0940   WBC 10*3/mm3 9.73   HEMOGLOBIN g/dL 15.2   HEMATOCRIT % 46.7   PLATELETS 10*3/mm3 206                               EKG/Telemetry: Sinus rhythm    Radiology Data:   No radiology results for the last day   Results for orders placed during the hospital encounter of 05/18/24    Adult Transthoracic Echo Complete W/ Cont if Necessary Per Protocol    Interpretation Summary    Left ventricular ejection fraction appears to be 61 - 65%.    LV " wall motion is normal    No significant valvular disease       Current Medications:  sodium chloride, 10 mL, Intravenous, Q12H           Assessment:     SVT  May 2024, hospitalization  Hypertension    Plan:     Patient is here today for EP study +/- SVT RFA. Procedure, risk, and alternatives have been discussed with the patient and he is agreeable to proceed.  Further recommendations to follow.    Electronically signed by NIKKI Douglass, 06/24/24, 7:11 AM EDT.     Please note that portions of this note were dictated utilizing Dragon dictation.

## 2024-06-25 ENCOUNTER — CALL CENTER PROGRAMS (OUTPATIENT)
Dept: CALL CENTER | Facility: HOSPITAL | Age: 54
End: 2024-06-25
Payer: MEDICAID

## 2024-06-25 NOTE — OUTREACH NOTE
PCI/Device Survey      Flowsheet Row Responses   Facility patient discharged from? [facility]   Procedure date 06/24/24   Procedure (if device, specify in description) Ablation   Performing MD Other (annotate)  [Dr. Barboza]   Attempt successful? Yes   Call start time 0940   Call end time 1001   Is the patient taking prescribed medications: None   Nursing intervention Reminded to continue to take prescribed medications   Medication comments No change to pt's medications, pt v/u.   Does the patient have any of the following symptoms related to the cath/surgical site? --  [R. femoral site with dressing remains in place, no issues per pt. Pt v/u of his wound care and activity restrictions.]   Nursing intervention Patient education provided   Does the patient have an appointment scheduled with the cardiologist? Yes   If the patient is a current smoker, are they able to teach back resources for cessation? --  [smokes 1pk/day and reports that he will not stop smoking]   Did the patient feel prepared to go home on the same day as the procedure? Yes   Is the patient satisfied with the same day discharge process? Yes   PCI/Device call completed Yes            Shivani Ta Nurse

## 2024-10-04 NOTE — PROGRESS NOTES
"Sean Rodrigues  [id number]  1970  [phone]      10/10/2024      [facility] CARDIOLOGY     Referring Provider: No ref. 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 male who presents to my electrophysiology clinic for follow up of SVT s/p RFA AVNRT in June. Since the procedure, patient denies any recurrence of fast heart rates.  He has had a few episodes of overheating but his heart stayed in rhythm.  Denies chest pain, shortness of breath, lightheadedness, dizziness and syncope.  He is currently not on any medication other than Zoloft.  The pressure elevated today.  Patient states he has whitecoat syndrome.  He is following up with his PCP soon and ask him to monitor his blood pressure at home.    Outpatient Medications Marked as Taking for the 10/10/24 encounter (Office Visit) with Yannick Barboza MD   Medication Sig Dispense Refill    Sertraline HCl (ZOLOFT PO) Take 100 mg by mouth Daily.              Physical Exam  Vitals:    10/10/24 1417   BP: 142/100   BP Location: Right arm   Patient Position: Sitting   Cuff Size: Adult   Pulse: 94   Weight: 95.6 kg (210 lb 12.8 oz)   Height: 188 cm (74\")     Body mass index is 27.07 kg/m².  Constitutional:       Appearance: Healthy appearance.   Neck:      Vascular: JVD normal.   Pulmonary:      Effort: Pulmonary effort is normal.      Breath sounds: Normal breath sounds.   Cardiovascular:      Normal rate. Regular rhythm. Normal S1. Normal S2.       Murmurs: There is no murmur.      No gallop.  No rub.   Pulses:     Intact distal pulses.   Edema:     Peripheral edema absent.   Neurological:      General: No focal deficit present.      " "    Diagnostic Data    ECG 12 Lead    Date/Time: 10/10/2024 2:49 PM  Performed by: Cassandra Malloy APRN    Authorized by: Cassandra Malloy APRN  Comparison: compared with previous ECG from 6/24/2024  Rhythm: sinus rhythm  Rate: normal  BPM: 94  QRS axis: normal    Clinical impression: normal ECG          Lab Results   Component Value Date    GLUCOSE 131 (H) 06/17/2024    CALCIUM 9.0 06/17/2024     06/17/2024    K 4.1 06/17/2024    CO2 30.0 (H) 06/17/2024     06/17/2024    BUN 11 06/17/2024    CREATININE 1.06 06/17/2024    BCR 10.4 06/17/2024    ANIONGAP 8.0 06/17/2024     Lab Results   Component Value Date    WBC 9.73 06/17/2024    HGB 15.2 06/17/2024    HCT 46.7 06/17/2024    MCV 93.6 06/17/2024     06/17/2024     No results found for: \"INR\", \"PROTIME\"  Lab Results   Component Value Date    TSH 1.700 05/18/2024       I personally viewed and interpreted the patient's EKG/Telemetry/lab data    Sean Rodrigues  reports that he has been smoking cigarettes. He has never used smokeless tobacco. I have educated him on the risk of diseases from using tobacco products such as cancer, COPD, and heart disease.         Assessment and Plan  Diagnoses and all orders for this visit:    1. SVT (supraventricular tachycardia) (Primary)  -     ECG 12 Lead    2. 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      Hypertension  -Elevated today in clinic.  Continue current medications  -He is following up with his PCP in the upcoming weeks    Follow-Up  Return in about 9 months (around 7/10/2025).      Thank you for allowing me to participate in the care of your patient. Please to not hesitate to contact me with additional questions or concerns.     NIKKI Douglass      "

## 2024-10-10 ENCOUNTER — OFFICE VISIT (OUTPATIENT)
Dept: CARDIOLOGY | Facility: CLINIC | Age: 54
End: 2024-10-10
Payer: MEDICAID

## 2024-10-10 VITALS
SYSTOLIC BLOOD PRESSURE: 142 MMHG | HEART RATE: 94 BPM | WEIGHT: 210.8 LBS | DIASTOLIC BLOOD PRESSURE: 100 MMHG | HEIGHT: 74 IN | BODY MASS INDEX: 27.05 KG/M2

## 2024-10-10 DIAGNOSIS — I10 ESSENTIAL HYPERTENSION: Chronic | ICD-10-CM

## 2024-10-10 DIAGNOSIS — I47.10 SVT (SUPRAVENTRICULAR TACHYCARDIA): Primary | Chronic | ICD-10-CM

## 2025-07-10 ENCOUNTER — OFFICE VISIT (OUTPATIENT)
Dept: CARDIOLOGY | Facility: CLINIC | Age: 55
End: 2025-07-10
Payer: MEDICAID

## 2025-07-10 VITALS
OXYGEN SATURATION: 97 % | DIASTOLIC BLOOD PRESSURE: 84 MMHG | BODY MASS INDEX: 26.82 KG/M2 | WEIGHT: 209 LBS | SYSTOLIC BLOOD PRESSURE: 142 MMHG | HEIGHT: 74 IN | HEART RATE: 87 BPM

## 2025-07-10 DIAGNOSIS — I47.10 SVT (SUPRAVENTRICULAR TACHYCARDIA): Primary | Chronic | ICD-10-CM

## 2025-07-10 DIAGNOSIS — I10 ESSENTIAL HYPERTENSION: Chronic | ICD-10-CM

## 2025-07-10 RX ORDER — LISINOPRIL 10 MG/1
1 TABLET ORAL DAILY
COMMUNITY
Start: 2025-05-14

## 2025-07-10 RX ORDER — SERTRALINE HYDROCHLORIDE 100 MG/1
150 TABLET, FILM COATED ORAL DAILY
COMMUNITY
Start: 2025-05-14

## 2025-07-10 NOTE — PROGRESS NOTES
"Sean Rodrigues  [id number]  1970  [phone]    [facility] CARDIOLOGY     Referring Provider: No ref. 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 male who presents to my electrophysiology clinic for follow up of SVT s/p RFA AVNRT in June.  Since we last saw the patient, he has done well from a cardiac standpoint.  He denies any palpitations, shortness of breath, lightheadedness, dizziness and syncope.  He recently saw his PCP where he was started on blood pressure medication.  Patient still has a touch of whitecoat syndrome.  Patient is still umpiring and very active playing golf.    Outpatient Medications Marked as Taking for the 7/10/25 encounter (Office Visit) with Yannick Barboza MD   Medication Sig Dispense Refill    lisinopril (PRINIVIL,ZESTRIL) 10 MG tablet Take 1 tablet by mouth Daily.      sertraline (ZOLOFT) 100 MG tablet Take 1.5 tablets by mouth Daily.              Physical Exam  Vitals:    07/10/25 1416   BP: 142/84   BP Location: Left arm   Patient Position: Sitting   Cuff Size: Adult   Pulse: 87   SpO2: 97%   Weight: 94.8 kg (209 lb)   Height: 188 cm (74\")     Body mass index is 26.83 kg/m².  Constitutional:       Appearance: Healthy appearance.   Neck:      Vascular: JVD normal.   Pulmonary:      Effort: Pulmonary effort is normal.      Breath sounds: Normal breath sounds.   Cardiovascular:      Normal rate. Regular rhythm. Normal S1. Normal S2.       Murmurs: There is no murmur.      No gallop.  No rub.   Pulses:     Intact distal pulses.   Edema:     Peripheral edema absent.   Neurological:      General: No focal deficit present.      " Chief Complaint   Patient presents with    Fever     Pt's mother states that she had an fever yesterday and this morning "    Diagnostic Data    ECG 12 Lead    Date/Time: 7/10/2025 2:31 PM  Performed by: Cassandra Malloy APRN    Authorized by: Cassandra Malloy APRN  Comparison: compared with previous ECG from 10/10/2024  Rhythm: sinus rhythm  Ectopy: unifocal PVCs  Rate: normal  BPM: 87  QRS axis: normal    Clinical impression: normal ECG          Lab Results   Component Value Date    GLUCOSE 131 (H) 06/17/2024    CALCIUM 9.0 06/17/2024     06/17/2024    K 4.1 06/17/2024    CO2 30.0 (H) 06/17/2024     06/17/2024    BUN 11 06/17/2024    CREATININE 1.06 06/17/2024    BCR 10.4 06/17/2024    ANIONGAP 8.0 06/17/2024     Lab Results   Component Value Date    WBC 9.73 06/17/2024    HGB 15.2 06/17/2024    HCT 46.7 06/17/2024    MCV 93.6 06/17/2024     06/17/2024     No results found for: \"INR\", \"PROTIME\"  Lab Results   Component Value Date    TSH 1.700 05/18/2024       I personally viewed and interpreted the patient's EKG/Telemetry/lab data    Sean Rodrigues  reports that he has been smoking cigarettes. He started smoking about 37 years ago. He has a 37.5 pack-year smoking history. He has been exposed to tobacco smoke. He has never used smokeless tobacco. I have educated him on the risk of diseases from using tobacco products such as cancer, COPD, and heart disease.         Assessment and Plan  Diagnoses and all orders for this visit:    1. SVT (supraventricular tachycardia) (Primary)    2. 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   -Patient will follow-up on an as-needed basis.     Hypertension  -Elevated today in clinic.   -Recently started lisinopril    Follow-Up  Return if symptoms worsen or fail to improve.      Thank you for allowing me to participate in the care of your patient. Please to not hesitate to contact me with additional questions or concerns.     "

## (undated) DEVICE — CATH EP SUPRM QUADPOLAR JSN 5F 5MM 120CM

## (undated) DEVICE — Device: Brand: SMARTABLATE

## (undated) DEVICE — ADULT NASAL CO2 SAMPLING WITH O2 DELIVERY CANNULA FOR CAPNOFLEX MODULE: Brand: VITAL SIGNS™

## (undated) DEVICE — SET PRIMARY GRVTY 10DP MALE LL 104IN

## (undated) DEVICE — Device: Brand: REFERENCE PATCH CARTO 3

## (undated) DEVICE — Device: Brand: THERMOCOOL SMARTTOUCH SF

## (undated) DEVICE — PINNACLE INTRODUCER SHEATH: Brand: PINNACLE

## (undated) DEVICE — CATH EP SUPREME QUADPOLAR CRD2 5F 5MM 120CM

## (undated) DEVICE — ST INF PRI SMRTSTE 20DRP 2VLV 24ML 117

## (undated) DEVICE — DECANT BG O JET

## (undated) DEVICE — ST EXT IV SMRTSTE 2VLV FIX M LL 6ML 41

## (undated) DEVICE — DRSNG SURESITE123 4X4.8IN

## (undated) DEVICE — SOL NACL 0.9PCT 1000ML

## (undated) DEVICE — Device: Brand: WEBSTER CS

## (undated) DEVICE — PERCLOSE™ PROSTYLE™ SUTURE-MEDIATED CLOSURE AND REPAIR SYSTEM: Brand: PERCLOSE™ PROSTYLE™

## (undated) DEVICE — PAD GRND REM POLYHESIVE A/ DISP

## (undated) DEVICE — LEX ELECTRO PHYSIOLOGY: Brand: MEDLINE INDUSTRIES, INC.

## (undated) DEVICE — ADULT, W/LG. BACK PAD, RADIOTRANSPARENT ELEMENT AND LEAD WIRE COMPATIBLE W/: Brand: DEFIBRILLATION ELECTRODES

## (undated) DEVICE — LIMB HOLDER, WRIST/ANKLE: Brand: DEROYAL

## (undated) DEVICE — INTRO STEER AGILIS NXT MED/CURL 8.5F